# Patient Record
Sex: FEMALE | Race: WHITE | HISPANIC OR LATINO
[De-identification: names, ages, dates, MRNs, and addresses within clinical notes are randomized per-mention and may not be internally consistent; named-entity substitution may affect disease eponyms.]

---

## 2020-01-13 PROBLEM — Z00.00 ENCOUNTER FOR PREVENTIVE HEALTH EXAMINATION: Status: ACTIVE | Noted: 2020-01-13

## 2020-01-14 ENCOUNTER — APPOINTMENT (OUTPATIENT)
Dept: OBGYN | Facility: CLINIC | Age: 30
End: 2020-01-14
Payer: MEDICAID

## 2020-01-14 VITALS
BODY MASS INDEX: 19.88 KG/M2 | SYSTOLIC BLOOD PRESSURE: 120 MMHG | WEIGHT: 108 LBS | DIASTOLIC BLOOD PRESSURE: 68 MMHG | HEIGHT: 62 IN

## 2020-01-14 DIAGNOSIS — N89.8 OTHER SPECIFIED NONINFLAMMATORY DISORDERS OF VAGINA: ICD-10-CM

## 2020-01-14 LAB
BILIRUB UR QL STRIP: ABNORMAL
GLUCOSE UR-MCNC: NORMAL
HCG UR QL: 1 EU/DL
HCG UR QL: NEGATIVE
HGB UR QL STRIP.AUTO: NORMAL
KETONES UR-MCNC: NORMAL
LEUKOCYTE ESTERASE UR QL STRIP: NORMAL
NITRITE UR QL STRIP: NORMAL
PH UR STRIP: 5.5
PROT UR STRIP-MCNC: ABNORMAL
QUALITY CONTROL: YES
SP GR UR STRIP: 1.03

## 2020-01-14 PROCEDURE — 81003 URINALYSIS AUTO W/O SCOPE: CPT | Mod: QW

## 2020-01-14 PROCEDURE — 99213 OFFICE O/P EST LOW 20 MIN: CPT

## 2020-01-14 PROCEDURE — 81025 URINE PREGNANCY TEST: CPT

## 2020-01-14 NOTE — REVIEW OF SYSTEMS
[Nl] : Musculoskeletal [Painful Periods] : painful periods [FreeTextEntry2] : Vaginal dryness, odor and discharge

## 2020-01-14 NOTE — PHYSICAL EXAM
[Oriented x3] : oriented to person, place, and time [Awake] : awake [Alert] : alert [Normal] : cervix [Scant] : scant [Discharge] : a  ~M vaginal discharge was present [Cheesy] : cheesy [White] : white [Uterine Adnexae] : were not tender and not enlarged [Tenderness] : nontender [Motion Tenderness] : there was no cervical motion tenderness [Mass ___ cm] : no uterine mass was palpated [Enlarged ___ wks] : not enlarged

## 2020-01-14 NOTE — HISTORY OF PRESENT ILLNESS
[Last Pap ___] : Last cervical pap smear was [unfilled] [Definite:  ___ (Date)] : the last menstrual period was [unfilled] [Menarche Age: ____] : age at menarche was [unfilled] [Regular Cycle Intervals] : periods have been regular [Sexually Active] : is sexually active [Contraception] : uses contraception [Oral Contraceptives] : uses oral contraceptives [Male ___] : [unfilled] male [Yes] : yes [de-identified] : Pelvic U/S: 8/6/2009

## 2020-01-16 LAB
C TRACH RRNA SPEC QL NAA+PROBE: NOT DETECTED
N GONORRHOEA RRNA SPEC QL NAA+PROBE: NOT DETECTED
SOURCE AMPLIFICATION: NORMAL

## 2020-01-17 LAB
CANDIDA VAG CYTO: NOT DETECTED
G VAGINALIS+PREV SP MTYP VAG QL MICRO: DETECTED
T VAGINALIS VAG QL WET PREP: NOT DETECTED

## 2020-02-07 ENCOUNTER — APPOINTMENT (OUTPATIENT)
Dept: OBGYN | Facility: CLINIC | Age: 30
End: 2020-02-07

## 2020-12-29 ENCOUNTER — APPOINTMENT (OUTPATIENT)
Dept: NEUROLOGY | Facility: CLINIC | Age: 30
End: 2020-12-29
Payer: COMMERCIAL

## 2020-12-29 VITALS
HEIGHT: 62 IN | BODY MASS INDEX: 19.88 KG/M2 | DIASTOLIC BLOOD PRESSURE: 80 MMHG | WEIGHT: 108 LBS | SYSTOLIC BLOOD PRESSURE: 128 MMHG | HEART RATE: 78 BPM | TEMPERATURE: 97.5 F

## 2020-12-29 DIAGNOSIS — Z86.39 PERSONAL HISTORY OF OTHER ENDOCRINE, NUTRITIONAL AND METABOLIC DISEASE: ICD-10-CM

## 2020-12-29 DIAGNOSIS — Z78.9 OTHER SPECIFIED HEALTH STATUS: ICD-10-CM

## 2020-12-29 PROCEDURE — 99072 ADDL SUPL MATRL&STAF TM PHE: CPT

## 2020-12-29 PROCEDURE — 99203 OFFICE O/P NEW LOW 30 MIN: CPT

## 2020-12-29 NOTE — DISCUSSION/SUMMARY
[FreeTextEntry1] : 30-year-old woman history of attention deficit disorder, stable, reviewed and discussed treatment  options.\par We'll do a trial with Vyvanse 30 mg po QD.\par Discussed possible side effects.

## 2020-12-29 NOTE — HISTORY OF PRESENT ILLNESS
[FreeTextEntry1] : 30 year old woman DX with ADHD, on Adderall XR 30 mg in AM and Adderall 15 mg in afternoon. needs a new prescriber. Tolerant of both doses, no reported headaches, CP, palpitations, no mood changes, trouble sleeping. medications work well, help her stay focused, stay on task, finish her work on time. Admits to not using the 50 mg dose as much as she did past, after schedule was changed. Would like a longer acting daily medication.\par

## 2020-12-29 NOTE — PHYSICAL EXAM
[General Appearance - Alert] : alert [General Appearance - In No Acute Distress] : in no acute distress [Oriented To Time, Place, And Person] : oriented to person, place, and time [Impaired Insight] : insight and judgment were intact [Affect] : the affect was normal [Person] : oriented to person [Place] : oriented to place [Time] : oriented to time [Visual Intact] : visual attention was ~T not ~L decreased [Writing A Sentence] : no difficulty writing a sentence [Fluency] : fluency intact [Comprehension] : comprehension intact [Reading] : reading intact [Current Events] : adequate knowledge of current events [Past History] : adequate knowledge of personal past history [Vocabulary] : adequate range of vocabulary [Cranial Nerves Optic (II)] : visual acuity intact bilaterally,  visual fields full to confrontation, pupils equal round and reactive to light [Cranial Nerves Oculomotor (III)] : extraocular motion intact [Cranial Nerves Trigeminal (V)] : facial sensation intact symmetrically [Cranial Nerves Facial (VII)] : face symmetrical [Cranial Nerves Vestibulocochlear (VIII)] : hearing was intact bilaterally [Cranial Nerves Glossopharyngeal (IX)] : tongue and palate midline [Cranial Nerves Accessory (XI - Cranial And Spinal)] : head turning and shoulder shrug symmetric [Cranial Nerves Hypoglossal (XII)] : there was no tongue deviation with protrusion [Motor Tone] : muscle tone was normal in all four extremities [Motor Strength] : muscle strength was normal in all four extremities [Motor Handedness Right-Handed] : the patient is right hand dominant [Paresis Pronator Drift Right-Sided] : no pronator drift on the right [Paresis Pronator Drift Left-Sided] : no pronator drift on the left [Abnormal Walk] : normal gait [Past-pointing] : there was no past-pointing [Tremor] : no tremor present [Dysdiadochokinesia Bilaterally] : not present [Sclera] : the sclera and conjunctiva were normal [PERRL With Normal Accommodation] : pupils were equal in size, round, reactive to light, with normal accommodation [Extraocular Movements] : extraocular movements were intact [Outer Ear] : the ears and nose were normal in appearance [Hearing Threshold Finger Rub Not [place]] : hearing was normal [Neck Appearance] : the appearance of the neck was normal [] : the neck was supple

## 2021-01-04 ENCOUNTER — TRANSCRIPTION ENCOUNTER (OUTPATIENT)
Age: 31
End: 2021-01-04

## 2021-01-06 ENCOUNTER — APPOINTMENT (OUTPATIENT)
Dept: NEUROLOGY | Facility: CLINIC | Age: 31
End: 2021-01-06
Payer: COMMERCIAL

## 2021-01-06 PROCEDURE — 99213 OFFICE O/P EST LOW 20 MIN: CPT | Mod: 95

## 2021-01-06 NOTE — REASON FOR VISIT
[Home] : at home, [unfilled] , at the time of the visit. [Medical Office: ([facility])___] : at the medical office located in  [Verbal consent obtained from patient] : the patient, [unfilled]

## 2021-01-06 NOTE — DISCUSSION/SUMMARY
[FreeTextEntry1] : 30-year-old woman with a history of attention deficit disorder, doing well and tolerated Vyvanse 30 mg aily, would like to try a higher dose. Discussed possible side effects.\par Plan: vyvanse 40 mg po QD\par Follow up in one month.

## 2021-01-28 ENCOUNTER — APPOINTMENT (OUTPATIENT)
Dept: NEUROLOGY | Facility: CLINIC | Age: 31
End: 2021-01-28
Payer: COMMERCIAL

## 2021-01-28 PROCEDURE — 99212 OFFICE O/P EST SF 10 MIN: CPT | Mod: 95

## 2021-01-28 NOTE — HISTORY OF PRESENT ILLNESS
[FreeTextEntry1] : 30-year-old woman with attention deficit disorder, a followup visit. Prescribed Vyvanse ow 40 mg daily,switch from lateral. 5 the medication much more agreeable.Last longer, keeps herin focus, able to do her work on time. Reports no difficulty with the medication, trouble sleeping, good appetite, mood has not changed. She would like to try the 50 mg dose, perhaps hopefully lasting a little longer.\par No complaints of headache, chest or palpitation, no trouble sleeping, mood changes.

## 2021-01-28 NOTE — DISCUSSION/SUMMARY
[FreeTextEntry1] : 30-year-old woman history of attention deficit disorder, doing well on Vyvanse, would like to try higher dose. Discussed possible side effects.\par plan: Increase Vyvanse 50 mg p.o. q. day\par

## 2021-06-02 RX ORDER — DEXTROAMPHETAMINE SACCHARATE, AMPHETAMINE ASPARTATE, DEXTROAMPHETAMINE SULFATE AND AMPHETAMINE SULFATE 3.75; 3.75; 3.75; 3.75 MG/1; MG/1; MG/1; MG/1
15 TABLET ORAL
Qty: 30 | Refills: 0 | Status: DISCONTINUED | COMMUNITY
Start: 2020-11-30 | End: 2021-06-02

## 2021-06-02 RX ORDER — LISDEXAMFETAMINE DIMESYLATE 50 MG/1
50 CAPSULE ORAL
Qty: 7 | Refills: 0 | Status: DISCONTINUED | COMMUNITY
Start: 2021-03-04 | End: 2021-06-02

## 2021-06-02 RX ORDER — CLOTRIMAZOLE AND BETAMETHASONE DIPROPIONATE 10; .5 MG/G; MG/G
1-0.05 CREAM TOPICAL TWICE DAILY
Qty: 1 | Refills: 0 | Status: DISCONTINUED | COMMUNITY
Start: 2020-01-14 | End: 2021-06-02

## 2021-08-11 ENCOUNTER — NON-APPOINTMENT (OUTPATIENT)
Age: 31
End: 2021-08-11

## 2021-08-23 ENCOUNTER — APPOINTMENT (OUTPATIENT)
Dept: NEUROLOGY | Facility: CLINIC | Age: 31
End: 2021-08-23

## 2021-08-23 NOTE — HISTORY OF PRESENT ILLNESS
[FreeTextEntry1] : 30 year old woman hx of ADD, last visit prescribed Vyvanse 50 mg po Qd, so far well tolerated, no reported problems with the medication. Keeps her in focus, allow her to finish work on time.\par No recent illnesses, no new medications.\par Denies any difficulty with sleep, mood changes, no CP, palpitations.\par

## 2021-08-23 NOTE — REASON FOR VISIT
[Home] : at home, [unfilled] , at the time of the visit. [Medical Office: ([facility])___] : at the medical office located in  [Verbal consent obtained from patient] : the patient, [unfilled]

## 2021-09-14 ENCOUNTER — APPOINTMENT (OUTPATIENT)
Dept: NEUROLOGY | Facility: CLINIC | Age: 31
End: 2021-09-14

## 2021-10-05 RX ORDER — LISDEXAMFETAMINE DIMESYLATE 40 MG/1
40 CAPSULE ORAL
Qty: 30 | Refills: 0 | Status: DISCONTINUED | COMMUNITY
Start: 2021-08-12 | End: 2021-10-05

## 2021-11-19 ENCOUNTER — APPOINTMENT (OUTPATIENT)
Dept: NEUROLOGY | Facility: CLINIC | Age: 31
End: 2021-11-19

## 2021-12-23 ENCOUNTER — APPOINTMENT (OUTPATIENT)
Dept: NEUROLOGY | Facility: CLINIC | Age: 31
End: 2021-12-23
Payer: MEDICAID

## 2021-12-23 PROCEDURE — 99212 OFFICE O/P EST SF 10 MIN: CPT | Mod: 95

## 2021-12-23 NOTE — DISCUSSION/SUMMARY
[FreeTextEntry1] : 31-year-old woman history of attention deficit disorder, doing well on present treatment, tolerating Vyvanse 50 mg daily. Review treatment, no change in this time period\par return to office, 4 months

## 2021-12-23 NOTE — HISTORY OF PRESENT ILLNESS
[FreeTextEntry1] : 31-year-old woman with patient deficit disorder, for followup visit. Reports feeling well, no new illnesses or complaints.Presently prescribed Vyvanse 0 mg daily, is well tolerated, no reported problems with sleep,no chest or palpitation, no blood issues.\par She reports her medication allows her to attention, finish her work on time. 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

## 2022-02-18 ENCOUNTER — APPOINTMENT (OUTPATIENT)
Dept: NEUROLOGY | Facility: CLINIC | Age: 32
End: 2022-02-18

## 2022-04-27 ENCOUNTER — APPOINTMENT (OUTPATIENT)
Dept: NEUROLOGY | Facility: CLINIC | Age: 32
End: 2022-04-27

## 2022-05-20 ENCOUNTER — APPOINTMENT (OUTPATIENT)
Dept: NEUROLOGY | Facility: CLINIC | Age: 32
End: 2022-05-20

## 2022-06-10 ENCOUNTER — APPOINTMENT (OUTPATIENT)
Dept: NEUROLOGY | Facility: CLINIC | Age: 32
End: 2022-06-10
Payer: MEDICAID

## 2022-06-10 VITALS
WEIGHT: 119 LBS | HEIGHT: 62 IN | SYSTOLIC BLOOD PRESSURE: 135 MMHG | HEART RATE: 102 BPM | TEMPERATURE: 97.8 F | BODY MASS INDEX: 21.9 KG/M2 | DIASTOLIC BLOOD PRESSURE: 97 MMHG

## 2022-06-10 PROCEDURE — 99214 OFFICE O/P EST MOD 30 MIN: CPT

## 2022-06-10 RX ORDER — FLUCONAZOLE 150 MG/1
150 TABLET ORAL
Qty: 2 | Refills: 1 | Status: DISCONTINUED | COMMUNITY
Start: 2020-01-14 | End: 2022-06-10

## 2022-06-10 NOTE — DISCUSSION/SUMMARY
[FreeTextEntry1] : 31-year-old woman, with attention deficit disorder, doing well, stable on present dose of Vyvanse 50 mg once a day.  As noted his effects wane at the afternoon, discussed options.\par Plan: Continue with Vyvanse 50 mg p.o. every morning.\par Adderall 10 mg p.o. in mid afternoon.\par Advised to pay attention and its possible effect on ability to go to sleep. \par Return to office, 6 months

## 2022-06-10 NOTE — HISTORY OF PRESENT ILLNESS
[FreeTextEntry1] : 31-year-old woman, with a history of attention deficit disorder, here for follow-up visit.  Presently prescribed Vyvanse 50 mg once in the morning, with good results, helps her focus maintain attention.  She reports she takes her medication around 5 AM, her day was local for no significant, medications affecting complaint by the early to mid afternoon.\par No reported side effects, no trouble sleeping, no chest palpitation, no mood changes.

## 2022-09-23 NOTE — OB HISTORY
Hema Ponce   70 year old    Patient Care Team:  Beltran Michele MD as PCP - General (Family Practice)  Princess Fabian DO as Obstetrics & Gynecology (Obstetrics & Gynecology)  Sarita Shaw MD as General Surgeon (General Surgery)   Sarita Shaw MD    No chief complaint on file.  Ms. Ponce is a 70 year old female with a history of bilateral fibrocystic breast. Here for imaging and exam.      10/3/22-Brian. Screening Mammogram-    Patient denies any palpable mass, discharge, pain, or skin changes.    General Breast History:  Age at menarche: 15  Last menstrual period: 54  : 2 Para: 2  Age at first completed pregnancy: 23  Breast-feeding history: Yes, briefly   History of oral contraceptives: Year 1 year   History of hormone replacement therapy or fertility assistance: No   Previous Breast Biopsies or Surgeries: Right breast bx unsure of year  Any  first degree relative breast or ovarian cancer Sister breast cancer late 50's  Ethnicity:      BREAST IMAGING:      [___] : Total Pregnancies: [unfilled]

## 2023-01-06 ENCOUNTER — APPOINTMENT (OUTPATIENT)
Dept: NEUROLOGY | Facility: CLINIC | Age: 33
End: 2023-01-06

## 2023-01-24 ENCOUNTER — APPOINTMENT (OUTPATIENT)
Dept: NEUROLOGY | Facility: CLINIC | Age: 33
End: 2023-01-24
Payer: MEDICAID

## 2023-01-24 ENCOUNTER — NON-APPOINTMENT (OUTPATIENT)
Age: 33
End: 2023-01-24

## 2023-01-24 VITALS
HEIGHT: 62 IN | WEIGHT: 115 LBS | DIASTOLIC BLOOD PRESSURE: 90 MMHG | SYSTOLIC BLOOD PRESSURE: 132 MMHG | HEART RATE: 99 BPM | TEMPERATURE: 97.8 F | BODY MASS INDEX: 21.16 KG/M2

## 2023-01-24 DIAGNOSIS — F98.8 OTHER SPECIFIED BEHAVIORAL AND EMOTIONAL DISORDERS WITH ONSET USUALLY OCCURRING IN CHILDHOOD AND ADOLESCENCE: ICD-10-CM

## 2023-01-24 PROCEDURE — 99213 OFFICE O/P EST LOW 20 MIN: CPT

## 2023-01-24 NOTE — REVIEW OF SYSTEMS
[Fever] : no fever [Chills] : no chills [Feeling Tired] : not feeling tired [Confused or Disoriented] : no confusion [Memory Lapses or Loss] : no memory loss [Decr. Concentrating Ability] : no decrease in concentrating ability [Difficulty with Language] : no ~M difficulty with language [Facial Weakness] : no facial weakness [Arm Weakness] : no arm weakness [Hand Weakness] : no hand weakness [Leg Weakness] : no leg weakness [Poor Coordination] : good coordination [Numbness] : no numbness [Tingling] : no tingling [Seizures] : no convulsions [Dizziness] : no dizziness [Fainting] : no fainting [Lightheadedness] : no lightheadedness [Vertigo] : no vertigo [Cluster Headache] : no cluster headache [Migraine Headache] : no migraine headache [Tension Headache] : no tension-type headache [Difficulty Walking] : no difficulty walking [Sleep Disturbances] : no sleep disturbances [Anxiety] : no anxiety [Depression] : no depression [Eye Pain] : no eye pain [Loss Of Hearing] : no hearing loss [Heart Rate Is Fast] : the heart rate was not fast [Chest Pain] : no chest pain [Palpitations] : no palpitations [Shortness Of Breath] : no shortness of breath [SOB on Exertion] : no shortness of breath during exertion [Abdominal Pain] : no abdominal pain [Swollen Glands] : no swollen glands [Swollen Glands In The Neck] : no swollen glands in the neck

## 2023-01-24 NOTE — DISCUSSION/SUMMARY
[FreeTextEntry1] : 32-year-old female with history of adult onset ADD.  Currently on Vyvanse 50 mg every morning and Adderall short acting 10 mg every 24 hours in p.m. patient stable on current regimen\par \par #Adult onset ADD\par \par 1.  Vyvanse 50 mg q. 24 in a.m.\par 2.  Amphetamine–dextroamphetamine 10 mg every 24 hours in p.m.\par 3.  Patient knows to call in 2 to 3 days prior for refills\par 4.  Return to clinic in 6 months for follow-up evaluation.

## 2023-01-24 NOTE — HISTORY OF PRESENT ILLNESS
[FreeTextEntry1] : 32-year-old female returns to clinic for follow-up evaluation for adult ADD last seen by Dr. Green in Kiki 10, 2022.  Patient diagnosed at 18 years of age.  Patient currently on Vyvanse 50 mg every morning and Adderall SA 10 mg every 24 hours for wear off.  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 pending her schedule that could be as early as 5 AM to as late as 9 AM.  Wear off usually occur 6 hours later depending on activity.  Patient has been on above regimen and stable for some time.  She denies any mood or behavioral changes insomnia, shortness of breath chest pain tachycardia palpitations.  Her adult ADHD self-report scale is as follows:\par How often do you have trouble wrapping up the final details of a project once the challenging parts have been done?  Rarely\par How often do you have difficulty getting things in order when you have to do a task that requires organization?  Rarely\par How often do you have problems remembering appointments or obligations?  Rarely\par When you have a task that requires a lot of thought how often do you avoid or delay getting started?  Rarely\par How often do you fidget or squirm with your hands or feet when you have to sit down for a long time?  Sometimes\par How often do you feel overly active or compelled to do things like you were driven by a motor?  Never\par How often do you make careless mistakes when you have to work on a boring or difficult project?  Sometimes\par How often do you have difficulty keeping your attention when you are doing boring or repetitive work?  Rarely\par How often do you have difficulty concentrating on what people say to you even when they are speaking to you directly?  Rarely\par How often do you misplace or have difficulty finding things at home or at work?  Rarely\par How often are you distracted by activity or noise around you?  Sometimes\par How often do you leave your seat in meetings or other situations in which you are expected to remain seated?  Never\par How often do you feel restless or fidgety?  Rarely\par How often do you have difficulty unwinding and relaxing when you have time to yourself?  Rarely\par How often do you find yourself talking too much when you are in social situations?  Never\par When you are in a conversation, how often do you find yourself finishing the sentences of the people you are talking to, before they can finish them themselves?  Never\par How often do you have difficulty waiting your turn in situations when turn-taking is required?  Never\par How often do you interrupt others when they are busy?  Never

## 2023-01-24 NOTE — PHYSICAL EXAM
[General Appearance - Alert] : alert [General Appearance - In No Acute Distress] : in no acute distress [Oriented To Time, Place, And Person] : oriented to person, place, and time [Cranial Nerves Optic (II)] : visual acuity intact bilaterally,  visual fields full to confrontation, pupils equal round and reactive to light [Cranial Nerves Oculomotor (III)] : extraocular motion intact [Cranial Nerves Trigeminal (V)] : facial sensation intact symmetrically [Cranial Nerves Facial (VII)] : face symmetrical [Cranial Nerves Vestibulocochlear (VIII)] : hearing was intact bilaterally [Cranial Nerves Glossopharyngeal (IX)] : tongue and palate midline [Cranial Nerves Accessory (XI - Cranial And Spinal)] : head turning and shoulder shrug symmetric [Cranial Nerves Hypoglossal (XII)] : there was no tongue deviation with protrusion [Motor Strength] : muscle strength was normal in all four extremities [Involuntary Movements] : no involuntary movements were seen [No Muscle Atrophy] : normal bulk in all four extremities [Motor Handedness Right-Handed] : the patient is right hand dominant [Sensation Tactile Decrease] : light touch was intact [Sensation Vibration Decrease] : vibration was intact [Proprioception] : proprioception was intact [Abnormal Walk] : normal gait [Balance] : balance was intact [2+] : Ankle jerk left 2+ [PERRL With Normal Accommodation] : pupils were equal in size, round, reactive to light, with normal accommodation [Extraocular Movements] : extraocular movements were intact [Full Visual Field] : full visual field [Hearing Threshold Finger Rub Not [place]] : hearing was normal [Neck Cervical Mass (___cm)] : no neck mass was observed [Jugular Venous Distention Increased] : there was no jugular-venous distention [Abdomen Soft] : soft [] : no rash [Skin Lesions] : no skin lesions [FreeTextEntry1] : Awake [Paresis Pronator Drift Right-Sided] : no pronator drift on the right [Paresis Pronator Drift Left-Sided] : no pronator drift on the left [Romberg's Sign] : Romberg's sign was negtive [Tremor] : no tremor present

## 2023-03-26 ENCOUNTER — NON-APPOINTMENT (OUTPATIENT)
Age: 33
End: 2023-03-26

## 2023-03-29 ENCOUNTER — NON-APPOINTMENT (OUTPATIENT)
Age: 33
End: 2023-03-29

## 2023-07-06 ENCOUNTER — APPOINTMENT (OUTPATIENT)
Dept: NEUROLOGY | Facility: CLINIC | Age: 33
End: 2023-07-06

## 2023-08-30 RX ORDER — DEXTROAMPHETAMINE SACCHARATE, AMPHETAMINE ASPARTATE MONOHYDRATE, DEXTROAMPHETAMINE SULFATE AND AMPHETAMINE SULFATE 2.5; 2.5; 2.5; 2.5 MG/1; MG/1; MG/1; MG/1
10 CAPSULE, EXTENDED RELEASE ORAL
Qty: 30 | Refills: 0 | Status: DISCONTINUED | COMMUNITY
Start: 2023-02-08 | End: 2023-08-30

## 2023-11-13 ENCOUNTER — TRANSCRIPTION ENCOUNTER (OUTPATIENT)
Age: 33
End: 2023-11-13

## 2023-11-14 ENCOUNTER — INPATIENT (INPATIENT)
Facility: HOSPITAL | Age: 33
LOS: 2 days | Discharge: HOME | DRG: 833 | End: 2023-11-17
Attending: STUDENT IN AN ORGANIZED HEALTH CARE EDUCATION/TRAINING PROGRAM | Admitting: STUDENT IN AN ORGANIZED HEALTH CARE EDUCATION/TRAINING PROGRAM
Payer: COMMERCIAL

## 2023-11-14 ENCOUNTER — APPOINTMENT (OUTPATIENT)
Dept: ANTEPARTUM | Facility: CLINIC | Age: 33
End: 2023-11-14
Payer: MEDICAID

## 2023-11-14 ENCOUNTER — ASOB RESULT (OUTPATIENT)
Age: 33
End: 2023-11-14

## 2023-11-14 ENCOUNTER — APPOINTMENT (OUTPATIENT)
Dept: OBGYN | Facility: CLINIC | Age: 33
End: 2023-11-14
Payer: MEDICAID

## 2023-11-14 ENCOUNTER — TRANSCRIPTION ENCOUNTER (OUTPATIENT)
Age: 33
End: 2023-11-14

## 2023-11-14 VITALS
DIASTOLIC BLOOD PRESSURE: 93 MMHG | RESPIRATION RATE: 20 BRPM | TEMPERATURE: 98 F | WEIGHT: 114.86 LBS | HEIGHT: 63 IN | HEART RATE: 99 BPM | OXYGEN SATURATION: 99 % | SYSTOLIC BLOOD PRESSURE: 137 MMHG

## 2023-11-14 VITALS
DIASTOLIC BLOOD PRESSURE: 72 MMHG | HEIGHT: 62 IN | WEIGHT: 115 LBS | BODY MASS INDEX: 21.16 KG/M2 | SYSTOLIC BLOOD PRESSURE: 110 MMHG

## 2023-11-14 DIAGNOSIS — O00.90 UNSPECIFIED ECTOPIC PREGNANCY WITHOUT INTRAUTERINE PREGNANCY: ICD-10-CM

## 2023-11-14 DIAGNOSIS — Z34.90 ENCOUNTER FOR SUPERVISION OF NORMAL PREGNANCY, UNSPECIFIED, UNSPECIFIED TRIMESTER: ICD-10-CM

## 2023-11-14 LAB
ALBUMIN SERPL ELPH-MCNC: 4.4 G/DL — SIGNIFICANT CHANGE UP (ref 3.3–5.2)
ALBUMIN SERPL ELPH-MCNC: 4.4 G/DL — SIGNIFICANT CHANGE UP (ref 3.3–5.2)
ALP SERPL-CCNC: 51 U/L — SIGNIFICANT CHANGE UP (ref 40–120)
ALP SERPL-CCNC: 51 U/L — SIGNIFICANT CHANGE UP (ref 40–120)
ALT FLD-CCNC: 12 U/L — SIGNIFICANT CHANGE UP
ALT FLD-CCNC: 12 U/L — SIGNIFICANT CHANGE UP
ANION GAP SERPL CALC-SCNC: 13 MMOL/L — SIGNIFICANT CHANGE UP (ref 5–17)
ANION GAP SERPL CALC-SCNC: 13 MMOL/L — SIGNIFICANT CHANGE UP (ref 5–17)
APPEARANCE UR: CLEAR — SIGNIFICANT CHANGE UP
APPEARANCE UR: CLEAR — SIGNIFICANT CHANGE UP
APTT BLD: 33.3 SEC — SIGNIFICANT CHANGE UP (ref 24.5–35.6)
APTT BLD: 33.3 SEC — SIGNIFICANT CHANGE UP (ref 24.5–35.6)
AST SERPL-CCNC: 14 U/L — SIGNIFICANT CHANGE UP
AST SERPL-CCNC: 14 U/L — SIGNIFICANT CHANGE UP
BASOPHILS # BLD AUTO: 0.05 K/UL — SIGNIFICANT CHANGE UP (ref 0–0.2)
BASOPHILS # BLD AUTO: 0.05 K/UL — SIGNIFICANT CHANGE UP (ref 0–0.2)
BASOPHILS NFR BLD AUTO: 0.5 % — SIGNIFICANT CHANGE UP (ref 0–2)
BASOPHILS NFR BLD AUTO: 0.5 % — SIGNIFICANT CHANGE UP (ref 0–2)
BILIRUB SERPL-MCNC: 0.3 MG/DL — LOW (ref 0.4–2)
BILIRUB SERPL-MCNC: 0.3 MG/DL — LOW (ref 0.4–2)
BILIRUB UR-MCNC: NEGATIVE — SIGNIFICANT CHANGE UP
BILIRUB UR-MCNC: NEGATIVE — SIGNIFICANT CHANGE UP
BLD GP AB SCN SERPL QL: SIGNIFICANT CHANGE UP
BLD GP AB SCN SERPL QL: SIGNIFICANT CHANGE UP
BUN SERPL-MCNC: 8.7 MG/DL — SIGNIFICANT CHANGE UP (ref 8–20)
BUN SERPL-MCNC: 8.7 MG/DL — SIGNIFICANT CHANGE UP (ref 8–20)
CALCIUM SERPL-MCNC: 9.3 MG/DL — SIGNIFICANT CHANGE UP (ref 8.4–10.5)
CALCIUM SERPL-MCNC: 9.3 MG/DL — SIGNIFICANT CHANGE UP (ref 8.4–10.5)
CHLORIDE SERPL-SCNC: 102 MMOL/L — SIGNIFICANT CHANGE UP (ref 96–108)
CHLORIDE SERPL-SCNC: 102 MMOL/L — SIGNIFICANT CHANGE UP (ref 96–108)
CO2 SERPL-SCNC: 23 MMOL/L — SIGNIFICANT CHANGE UP (ref 22–29)
CO2 SERPL-SCNC: 23 MMOL/L — SIGNIFICANT CHANGE UP (ref 22–29)
COLOR SPEC: YELLOW — SIGNIFICANT CHANGE UP
COLOR SPEC: YELLOW — SIGNIFICANT CHANGE UP
CREAT SERPL-MCNC: 0.57 MG/DL — SIGNIFICANT CHANGE UP (ref 0.5–1.3)
CREAT SERPL-MCNC: 0.57 MG/DL — SIGNIFICANT CHANGE UP (ref 0.5–1.3)
DIFF PNL FLD: NEGATIVE — SIGNIFICANT CHANGE UP
DIFF PNL FLD: NEGATIVE — SIGNIFICANT CHANGE UP
EGFR: 125 ML/MIN/1.73M2 — SIGNIFICANT CHANGE UP
EGFR: 125 ML/MIN/1.73M2 — SIGNIFICANT CHANGE UP
EOSINOPHIL # BLD AUTO: 0.02 K/UL — SIGNIFICANT CHANGE UP (ref 0–0.5)
EOSINOPHIL # BLD AUTO: 0.02 K/UL — SIGNIFICANT CHANGE UP (ref 0–0.5)
EOSINOPHIL NFR BLD AUTO: 0.2 % — SIGNIFICANT CHANGE UP (ref 0–6)
EOSINOPHIL NFR BLD AUTO: 0.2 % — SIGNIFICANT CHANGE UP (ref 0–6)
GLUCOSE SERPL-MCNC: 83 MG/DL — SIGNIFICANT CHANGE UP (ref 70–99)
GLUCOSE SERPL-MCNC: 83 MG/DL — SIGNIFICANT CHANGE UP (ref 70–99)
GLUCOSE UR QL: NEGATIVE MG/DL — SIGNIFICANT CHANGE UP
GLUCOSE UR QL: NEGATIVE MG/DL — SIGNIFICANT CHANGE UP
HCG SERPL-ACNC: 5165 MIU/ML — HIGH
HCG SERPL-ACNC: 5165 MIU/ML — HIGH
HCT VFR BLD CALC: 38.3 % — SIGNIFICANT CHANGE UP (ref 34.5–45)
HCT VFR BLD CALC: 38.3 % — SIGNIFICANT CHANGE UP (ref 34.5–45)
HGB BLD-MCNC: 13.4 G/DL — SIGNIFICANT CHANGE UP (ref 11.5–15.5)
HGB BLD-MCNC: 13.4 G/DL — SIGNIFICANT CHANGE UP (ref 11.5–15.5)
HIV 1 & 2 AB SERPL IA.RAPID: SIGNIFICANT CHANGE UP
HIV 1 & 2 AB SERPL IA.RAPID: SIGNIFICANT CHANGE UP
IMM GRANULOCYTES NFR BLD AUTO: 0.3 % — SIGNIFICANT CHANGE UP (ref 0–0.9)
IMM GRANULOCYTES NFR BLD AUTO: 0.3 % — SIGNIFICANT CHANGE UP (ref 0–0.9)
INR BLD: 1.19 RATIO — HIGH (ref 0.85–1.18)
INR BLD: 1.19 RATIO — HIGH (ref 0.85–1.18)
KETONES UR-MCNC: ABNORMAL MG/DL
KETONES UR-MCNC: ABNORMAL MG/DL
LEUKOCYTE ESTERASE UR-ACNC: NEGATIVE — SIGNIFICANT CHANGE UP
LEUKOCYTE ESTERASE UR-ACNC: NEGATIVE — SIGNIFICANT CHANGE UP
LYMPHOCYTES # BLD AUTO: 2.84 K/UL — SIGNIFICANT CHANGE UP (ref 1–3.3)
LYMPHOCYTES # BLD AUTO: 2.84 K/UL — SIGNIFICANT CHANGE UP (ref 1–3.3)
LYMPHOCYTES # BLD AUTO: 30.1 % — SIGNIFICANT CHANGE UP (ref 13–44)
LYMPHOCYTES # BLD AUTO: 30.1 % — SIGNIFICANT CHANGE UP (ref 13–44)
MCHC RBC-ENTMCNC: 31.5 PG — SIGNIFICANT CHANGE UP (ref 27–34)
MCHC RBC-ENTMCNC: 31.5 PG — SIGNIFICANT CHANGE UP (ref 27–34)
MCHC RBC-ENTMCNC: 35 GM/DL — SIGNIFICANT CHANGE UP (ref 32–36)
MCHC RBC-ENTMCNC: 35 GM/DL — SIGNIFICANT CHANGE UP (ref 32–36)
MCV RBC AUTO: 89.9 FL — SIGNIFICANT CHANGE UP (ref 80–100)
MCV RBC AUTO: 89.9 FL — SIGNIFICANT CHANGE UP (ref 80–100)
MONOCYTES # BLD AUTO: 0.48 K/UL — SIGNIFICANT CHANGE UP (ref 0–0.9)
MONOCYTES # BLD AUTO: 0.48 K/UL — SIGNIFICANT CHANGE UP (ref 0–0.9)
MONOCYTES NFR BLD AUTO: 5.1 % — SIGNIFICANT CHANGE UP (ref 2–14)
MONOCYTES NFR BLD AUTO: 5.1 % — SIGNIFICANT CHANGE UP (ref 2–14)
NEUTROPHILS # BLD AUTO: 6.03 K/UL — SIGNIFICANT CHANGE UP (ref 1.8–7.4)
NEUTROPHILS # BLD AUTO: 6.03 K/UL — SIGNIFICANT CHANGE UP (ref 1.8–7.4)
NEUTROPHILS NFR BLD AUTO: 63.8 % — SIGNIFICANT CHANGE UP (ref 43–77)
NEUTROPHILS NFR BLD AUTO: 63.8 % — SIGNIFICANT CHANGE UP (ref 43–77)
NITRITE UR-MCNC: NEGATIVE — SIGNIFICANT CHANGE UP
NITRITE UR-MCNC: NEGATIVE — SIGNIFICANT CHANGE UP
PH UR: 6.5 — SIGNIFICANT CHANGE UP (ref 5–8)
PH UR: 6.5 — SIGNIFICANT CHANGE UP (ref 5–8)
PLATELET # BLD AUTO: 359 K/UL — SIGNIFICANT CHANGE UP (ref 150–400)
PLATELET # BLD AUTO: 359 K/UL — SIGNIFICANT CHANGE UP (ref 150–400)
POTASSIUM SERPL-MCNC: 3.7 MMOL/L — SIGNIFICANT CHANGE UP (ref 3.5–5.3)
POTASSIUM SERPL-MCNC: 3.7 MMOL/L — SIGNIFICANT CHANGE UP (ref 3.5–5.3)
POTASSIUM SERPL-SCNC: 3.7 MMOL/L — SIGNIFICANT CHANGE UP (ref 3.5–5.3)
POTASSIUM SERPL-SCNC: 3.7 MMOL/L — SIGNIFICANT CHANGE UP (ref 3.5–5.3)
PROT SERPL-MCNC: 7 G/DL — SIGNIFICANT CHANGE UP (ref 6.6–8.7)
PROT SERPL-MCNC: 7 G/DL — SIGNIFICANT CHANGE UP (ref 6.6–8.7)
PROT UR-MCNC: NEGATIVE MG/DL — SIGNIFICANT CHANGE UP
PROT UR-MCNC: NEGATIVE MG/DL — SIGNIFICANT CHANGE UP
PROTHROM AB SERPL-ACNC: 13.1 SEC — HIGH (ref 9.5–13)
PROTHROM AB SERPL-ACNC: 13.1 SEC — HIGH (ref 9.5–13)
RBC # BLD: 4.26 M/UL — SIGNIFICANT CHANGE UP (ref 3.8–5.2)
RBC # BLD: 4.26 M/UL — SIGNIFICANT CHANGE UP (ref 3.8–5.2)
RBC # FLD: 12.2 % — SIGNIFICANT CHANGE UP (ref 10.3–14.5)
RBC # FLD: 12.2 % — SIGNIFICANT CHANGE UP (ref 10.3–14.5)
SODIUM SERPL-SCNC: 138 MMOL/L — SIGNIFICANT CHANGE UP (ref 135–145)
SODIUM SERPL-SCNC: 138 MMOL/L — SIGNIFICANT CHANGE UP (ref 135–145)
SP GR SPEC: 1.01 — SIGNIFICANT CHANGE UP (ref 1–1.03)
SP GR SPEC: 1.01 — SIGNIFICANT CHANGE UP (ref 1–1.03)
T3 SERPL-MCNC: 110 NG/DL — SIGNIFICANT CHANGE UP (ref 80–200)
T3 SERPL-MCNC: 110 NG/DL — SIGNIFICANT CHANGE UP (ref 80–200)
T4 AB SER-ACNC: 7.6 UG/DL — SIGNIFICANT CHANGE UP (ref 4.5–12)
T4 AB SER-ACNC: 7.6 UG/DL — SIGNIFICANT CHANGE UP (ref 4.5–12)
TSH SERPL-MCNC: 7.72 UIU/ML — HIGH (ref 0.27–4.2)
TSH SERPL-MCNC: 7.72 UIU/ML — HIGH (ref 0.27–4.2)
UROBILINOGEN FLD QL: 0.2 MG/DL — SIGNIFICANT CHANGE UP (ref 0.2–1)
UROBILINOGEN FLD QL: 0.2 MG/DL — SIGNIFICANT CHANGE UP (ref 0.2–1)
WBC # BLD: 9.45 K/UL — SIGNIFICANT CHANGE UP (ref 3.8–10.5)
WBC # BLD: 9.45 K/UL — SIGNIFICANT CHANGE UP (ref 3.8–10.5)
WBC # FLD AUTO: 9.45 K/UL — SIGNIFICANT CHANGE UP (ref 3.8–10.5)
WBC # FLD AUTO: 9.45 K/UL — SIGNIFICANT CHANGE UP (ref 3.8–10.5)

## 2023-11-14 PROCEDURE — 76817 TRANSVAGINAL US OBSTETRIC: CPT

## 2023-11-14 PROCEDURE — 59200 INSERT CERVICAL DILATOR: CPT

## 2023-11-14 PROCEDURE — 99204 OFFICE O/P NEW MOD 45 MIN: CPT | Mod: TH

## 2023-11-14 PROCEDURE — 99285 EMERGENCY DEPT VISIT HI MDM: CPT

## 2023-11-14 DEVICE — BLLN CERV RIPENING WITH STYLET 40CM 10 EA/BX: Type: IMPLANTABLE DEVICE | Status: FUNCTIONAL

## 2023-11-14 RX ORDER — IBUPROFEN 200 MG
600 TABLET ORAL EVERY 6 HOURS
Refills: 0 | Status: DISCONTINUED | OUTPATIENT
Start: 2023-11-14 | End: 2023-11-15

## 2023-11-14 RX ORDER — ACETAMINOPHEN 500 MG
1000 TABLET ORAL ONCE
Refills: 0 | Status: COMPLETED | OUTPATIENT
Start: 2023-11-14 | End: 2023-11-14

## 2023-11-14 RX ORDER — KETOROLAC TROMETHAMINE 30 MG/ML
15 SYRINGE (ML) INJECTION EVERY 8 HOURS
Refills: 0 | Status: DISCONTINUED | OUTPATIENT
Start: 2023-11-14 | End: 2023-11-15

## 2023-11-14 RX ORDER — METHOTREXATE 2.5 MG/1
76 TABLET ORAL ONCE
Refills: 0 | Status: COMPLETED | OUTPATIENT
Start: 2023-11-14 | End: 2023-11-14

## 2023-11-14 RX ORDER — SIMETHICONE 80 MG/1
80 TABLET, CHEWABLE ORAL EVERY 6 HOURS
Refills: 0 | Status: DISCONTINUED | OUTPATIENT
Start: 2023-11-14 | End: 2023-11-17

## 2023-11-14 RX ORDER — ONDANSETRON 8 MG/1
8 TABLET, FILM COATED ORAL EVERY 8 HOURS
Refills: 0 | Status: DISCONTINUED | OUTPATIENT
Start: 2023-11-14 | End: 2023-11-17

## 2023-11-14 RX ORDER — ACETAMINOPHEN 500 MG
1000 TABLET ORAL EVERY 6 HOURS
Refills: 0 | Status: DISCONTINUED | OUTPATIENT
Start: 2023-11-14 | End: 2023-11-15

## 2023-11-14 RX ORDER — FENTANYL CITRATE 50 UG/ML
25 INJECTION INTRAVENOUS
Refills: 0 | Status: DISCONTINUED | OUTPATIENT
Start: 2023-11-14 | End: 2023-11-14

## 2023-11-14 RX ORDER — ACETAMINOPHEN 500 MG
975 TABLET ORAL EVERY 6 HOURS
Refills: 0 | Status: DISCONTINUED | OUTPATIENT
Start: 2023-11-14 | End: 2023-11-15

## 2023-11-14 RX ADMIN — Medication 1000 MILLIGRAM(S): at 22:30

## 2023-11-14 RX ADMIN — FENTANYL CITRATE 25 MICROGRAM(S): 50 INJECTION INTRAVENOUS at 22:57

## 2023-11-14 RX ADMIN — METHOTREXATE 76 MILLIGRAM(S): 2.5 TABLET ORAL at 20:59

## 2023-11-14 RX ADMIN — Medication 15 MILLIGRAM(S): at 22:29

## 2023-11-14 RX ADMIN — Medication 15 MILLIGRAM(S): at 22:56

## 2023-11-14 RX ADMIN — Medication 400 MILLIGRAM(S): at 22:15

## 2023-11-14 RX ADMIN — FENTANYL CITRATE 25 MICROGRAM(S): 50 INJECTION INTRAVENOUS at 22:23

## 2023-11-14 RX ADMIN — FENTANYL CITRATE 25 MICROGRAM(S): 50 INJECTION INTRAVENOUS at 22:32

## 2023-11-14 RX ADMIN — FENTANYL CITRATE 25 MICROGRAM(S): 50 INJECTION INTRAVENOUS at 23:10

## 2023-11-14 RX ADMIN — FENTANYL CITRATE 25 MICROGRAM(S): 50 INJECTION INTRAVENOUS at 22:43

## 2023-11-14 RX ADMIN — FENTANYL CITRATE 25 MICROGRAM(S): 50 INJECTION INTRAVENOUS at 22:33

## 2023-11-14 NOTE — ED PROVIDER NOTE - NS ED ROS FT
Constitutional: (-) fever  (-)chills  (-)sweats  Eyes/ENT: (-)   Cardiovascular: (-) chest pain, (-) palpitations (-) edema   Respiratory: (-) cough, (-) shortness of breath   Gastrointestinal: (-)nausea  (-)vomiting, (-) diarrhea  (+) abdominal pain   :  (-)dysuria, (-)frequency, (-)urgency, (-)hematuria  +vaginal bleeding  Musculoskeletal: (-) neck pain, (-) back pain, (-) joint pain  Integumentary: (-) rash, (-) edema  Neurological: (-) headache, (-) altered mental status  (-)LOC

## 2023-11-14 NOTE — H&P ADULT - HISTORY OF PRESENT ILLNESS
33 yo  at 5w1d by LMP sent to [facility] ED by OBGYN provider due to findings of cervical ectopic pregnancy on TVUS. Patient seen and evaluated in ED,  she mentioned she learned she was pregnant last Friday. She had some nausea over and some abdominal pressure she denies any pain. She reports some vaginal spotting that started this morning. She otherwise denies any pain, vomiting, fevers, chills, diarrhea, weakness, lightheadedness, SOB.      Patient receives care with [facility]   LMP: 10/9/23      POBHx:    PGynHx: Denies fibroids, STIs, last pap years ago wnl. Reports hx cysts when young    PMHx: ADHD, hx thyroid storm at age 24 – dizziness, fatigue. Last saw endocrinologist “Dr. ARGUETA” many years ago, next appt 2023   PSHx: wisdom tooth extraction   Meds: Vyvanse 50mg QD, Adderall 10mg QHS   All: nkda  31 yo  at 5w1d by LMP sent to [facility] ED by OBGYN provider due to findings of cervical ectopic pregnancy on TVUS. Patient seen and evaluated in ED,  she mentioned she learned she was pregnant last Friday. She had some nausea over and some abdominal pressure she denies any pain. She reports some vaginal spotting that started this morning. She otherwise denies any pain, vomiting, fevers, chills, diarrhea, weakness, lightheadedness, SOB. She mentions her last PO was at 12pm.     Patient receives care with [facility]   LMP: 10/9/23      POBHx:    PGynHx: Denies fibroids, STIs, last pap years ago wnl. Reports hx cysts when young    PMHx: ADHD, hx thyroid storm at age 24 – dizziness, fatigue. Last saw endocrinologist “Dr. ARGUETA” many years ago, next appt 2023   PSHx: wisdom tooth extraction   Meds: Vyvanse 50mg QD, Adderall 10mg QHS   All: nkda

## 2023-11-14 NOTE — H&P ADULT - NSHPLABSRESULTS_GEN_ALL_CORE
TVUS    Transvaginal Ultrasound Obstetrical ([id number]) ([facility]   [id number])   ----------------------------------------------------------------------   Indications   Uterine size-date discrepancy, first trimester O26.841   Encounter for Fetal Viability/Dating      O36.80   Weeks of gestation of pregnancy not      Z3A.00   specified   ----------------------------------------------------------------------   Vital Signs   Weight (lb): 117   Height:   5'3"            BMI:       20.72   ----------------------------------------------------------------------   Fetal Evaluation   Num Of Fetuses:     1   Yolk Sac:        1.0   ----------------------------------------------------------------------   Biometry   GS:    6.9 mm   G.Age:  N/A           YUDELKA:   ----------------------------------------------------------------------   OB History   :  1     Term:  0    Stephen:  0    SAB:  0   TOP:     0    Ectopic: 0    Livin   ----------------------------------------------------------------------   Cervix Uterus Adnexa   Right Ovary   Size(cm)    3  x  2.42  x 1.83   Vol(ml): 6.96   Within normal limits   Left Ovary   Size(cm)   2.25  x  2.19  x 1.83   Vol(ml): 4.72   Within normal limits   ?Cul De Sac   No fluid seen   ----------------------------------------------------------------------   Comments   A gestational sac with a yolk sac are noted within the   cervix. No fetal pole seen at this time.

## 2023-11-14 NOTE — BRIEF OPERATIVE NOTE - NSICDXBRIEFPROCEDURE_GEN_ALL_CORE_FT
PROCEDURES:  Insertion, cervical ripening balloon catheter 14-Nov-2023 22:09:48  Del Monet Duran  US transvaginal 14-Nov-2023 22:09:58  Del Monet Duran

## 2023-11-14 NOTE — ED PROVIDER NOTE - OBJECTIVE STATEMENT
32yoF; with PMH signif for Hyperthyroidism,  @5weeks; now p/w vaginal bleeding today. patient report having generalized malaise over the past week. reports some abd cramping 2-3 days with vaginal spotting today. 32yoF; with PMH signif for Hyperthyroidism,  @5weeks; now p/w vaginal bleeding today. patient report having generalized malaise over the past week. reports some abd cramping 2-3 days with vaginal spotting today.  patient reports having some lightheadedness this morning as well. states she went to GYN who did bedside US revealing cervical ectopic pregnancy. patient sent to ED for MTX and surgical management.  denies f/c/s. denies cp/sob/palp.  PMH: Hyperthyroidism  SOCIAL: non-smoker

## 2023-11-14 NOTE — ED PROVIDER NOTE - CLINICAL SUMMARY MEDICAL DECISION MAKING FREE TEXT BOX
patient arrived with abdominal pain and vaginal bleeding. found to have ectopic cervical pregnancy.  hemodynamically stable.  will admit for surgical management.

## 2023-11-14 NOTE — ED ADULT NURSE NOTE - ASSOCIATED SYMPTOMS
pt. has pressure in lower abdomen that increases when she has to urinate. related to ectopic pregnancy.

## 2023-11-14 NOTE — BRIEF OPERATIVE NOTE - OPERATION/FINDINGS
Cook balloon inserted under US guidance, intrauterine balloon filled to 15cc, vaginal balloon filled to 10cc. Post-cook insertion TVUS performed; direct compression of cervical ectopic pregnancy visualized.

## 2023-11-14 NOTE — ED ADULT NURSE REASSESSMENT NOTE - NS ED NURSE REASSESS COMMENT FT1
Assumed care of pt from Kae HARTMANN at 1930. Pt is resting comfortably in stretcher. NAD. Pt is A&Ox4. Respirations are even and unlabored. Color is appropriate for race. Pt awaiting transport to OR. Report given to Jackie HARTMANN in same day surgery. Pt updated on plan of care. Pt sister at bedside.

## 2023-11-14 NOTE — H&P ADULT - ATTENDING COMMENTS
cervical ectopic noted today in office. sent in for MTX and for cervical balloon placement and inpatient management.   risks of bleeding were discussed.   she accepts blood products  Nita Kerr cervical ectopic noted today in office. sent in for MTX and for cervical balloon placement and inpatient management.   MTX given in ED. taken to OR for pelvic EUA, TVUS and placement of cook catheter under sono guidance for compression and control of bleeding.   risks of bleeding were discussed, including possible need for urgent hysterectomy  she accepts blood products  Nita Kerr

## 2023-11-14 NOTE — ED ADULT TRIAGE NOTE - CHIEF COMPLAINT QUOTE
ectopic pregnancy confirmed by us outpatient, sent to ed for eval mild discomfort. vaginal spotting. pt seen and told to come in by MD Willoughby for surgery

## 2023-11-14 NOTE — H&P ADULT - ASSESSMENT
A/P:   33 yo  at 5w1d by LMP sent to [facility] ED by OBGYN provider due to findings of cervical ectopic pregnancy on TVUS.   Patient informed of the risks and benefits of Methotrexate therapy, see chart for consents.         #Cervical Ectopic Pregnancy   -Gestational sac with yolk sac located in cervix, no fetal pole   -Plan for MTX   -CBC, CMP, T&S, serum Hcg  -To OR for EUA, placement of cervical balloon     Dispo: OR   Discussed with Dr. Yusuf and Dr. Lugo A/P:   33 yo  at 5w1d by LMP sent to [facility] ED by OBGYN provider due to findings of cervical ectopic pregnancy on TVUS. Patient is otherwise clinically and hemodynamically stable.   Patient informed of the risks and benefits of Methotrexate therapy, see chart for consents.         #Cervical Ectopic Pregnancy   -Gestational sac with yolk sac located in cervix, no fetal pole   -Plan for MTX   -CBC, CMP, T&S, serum Hcg  -To OR for EUA, placement of cervical balloon     Dispo: OR   Discussed with Dr. Yusuf and Dr. Lugo A/P:   33 yo  at 5w1d by LMP sent to [facility] ED by OBGYN provider due to findings of cervical ectopic pregnancy on TVUS. Patient is otherwise clinically and hemodynamically stable.   Patient informed of the risks and benefits of Methotrexate therapy, see chart for consents.         #Cervical Ectopic Pregnancy   -Gestational sac with yolk sac located in cervix, no fetal pole   -Plan for MTX pending labs  -CBC, CMP, T&S, serum Hcg  -To OR for EUA, placement of cervical balloon     Dispo: OR   Discussed with Dr. Yusuf and Dr. Lugo

## 2023-11-14 NOTE — ED PROVIDER NOTE - PHYSICAL EXAMINATION
General:     NAD  Head:     NC/AT, EOMI, oral mucosa moist  Neck:     trachea midline  Lungs:     CTA b/l, no w/r/r  CVS:     S1S2, RRR, no m/g/r  Abd:     +BS, s/nd, no organomegaly. ttp @ suprapubic, no rebound or guarding

## 2023-11-14 NOTE — ED ADULT NURSE NOTE - NS ED NOTE ABUSE RESPONSE YN
Infusion Nursing Note:  Winter Loya presents today for Zometa.    Patient seen by provider today: Yes: Dr. Tipton   present during visit today: Not Applicable.    Intravenous Access:  Peripheral IV placed in lab.    Treatment Conditions:  Lab Results   Component Value Date    HGB 14.1 06/10/2020     Lab Results   Component Value Date    WBC 3.9 06/10/2020      Lab Results   Component Value Date    ANEU 2.5 06/10/2020     Lab Results   Component Value Date     06/10/2020      Lab Results   Component Value Date     06/10/2020                   Lab Results   Component Value Date    POTASSIUM 3.5 06/10/2020             Lab Results   Component Value Date    CR 0.62 06/10/2020                   Lab Results   Component Value Date    ASHLEY 8.9 06/10/2020                Lab Results   Component Value Date    BILITOTAL 1.6 06/10/2020           Lab Results   Component Value Date    ALBUMIN 4.0 06/10/2020                    Lab Results   Component Value Date    ALT 47 06/10/2020           Lab Results   Component Value Date    AST 18 06/10/2020       Results reviewed, labs MET treatment parameters, ok to proceed with treatment.      Post Infusion Assessment:  Patient tolerated infusion without incident.  Blood return noted pre and post infusion.  Site patent and intact, free from redness, edema or discomfort.  No evidence of extravasations.  Access discontinued per protocol.       Discharge Plan:   Patient declined prescription refills.  Discharge instructions reviewed with: Patient.  Patient and/or family verbalized understanding of discharge instructions and all questions answered.  AVS to patient via [employer].  Next appointment needs to be scheduled.  Patient knows to call if she does not hear about Zometa in 3 months.   Patient discharged in stable condition accompanied by: self.  Departure Mode: Ambulatory.    Sarika Baig RN                        Yes

## 2023-11-14 NOTE — H&P ADULT - NSHPPHYSICALEXAM_GEN_ALL_CORE
Gen: NAD, well-appearing, AAOx3   Pulm: breathing comfortably on RA   Abd: soft, nontender, nondistended   Ext: non-tender, non-edematous   Pelvic: deferred at this time     Vital Signs Last 24 Hrs  T(C): 36.8 (14 Nov 2023 16:28), Max: 36.8 (14 Nov 2023 16:28)  T(F): 98.3 (14 Nov 2023 16:28), Max: 98.3 (14 Nov 2023 16:28)  HR: 99 (14 Nov 2023 16:28) (99 - 99)  BP: 137/93 (14 Nov 2023 16:28) (137/93 - 137/93)

## 2023-11-15 ENCOUNTER — TRANSCRIPTION ENCOUNTER (OUTPATIENT)
Age: 33
End: 2023-11-15

## 2023-11-15 LAB
CULTURE RESULTS: SIGNIFICANT CHANGE UP
CULTURE RESULTS: SIGNIFICANT CHANGE UP
HCG SERPL-ACNC: 2732 MIU/ML — HIGH
HCG SERPL-ACNC: 2732 MIU/ML — HIGH
SPECIMEN SOURCE: SIGNIFICANT CHANGE UP
SPECIMEN SOURCE: SIGNIFICANT CHANGE UP
T4 FREE SERPL-MCNC: 1.2 NG/DL — SIGNIFICANT CHANGE UP (ref 0.9–1.8)
T4 FREE SERPL-MCNC: 1.2 NG/DL — SIGNIFICANT CHANGE UP (ref 0.9–1.8)

## 2023-11-15 PROCEDURE — 99231 SBSQ HOSP IP/OBS SF/LOW 25: CPT | Mod: GC

## 2023-11-15 RX ORDER — LANOLIN ALCOHOL/MO/W.PET/CERES
3 CREAM (GRAM) TOPICAL AT BEDTIME
Refills: 0 | Status: DISCONTINUED | OUTPATIENT
Start: 2023-11-15 | End: 2023-11-17

## 2023-11-15 RX ORDER — LISDEXAMFETAMINE DIMESYLATE 70 MG/1
50 CAPSULE ORAL
Refills: 0 | Status: DISCONTINUED | OUTPATIENT
Start: 2023-11-15 | End: 2023-11-17

## 2023-11-15 RX ORDER — ACETAMINOPHEN 500 MG
975 TABLET ORAL EVERY 6 HOURS
Refills: 0 | Status: DISCONTINUED | OUTPATIENT
Start: 2023-11-15 | End: 2023-11-17

## 2023-11-15 RX ORDER — IBUPROFEN 200 MG
600 TABLET ORAL EVERY 6 HOURS
Refills: 0 | Status: DISCONTINUED | OUTPATIENT
Start: 2023-11-15 | End: 2023-11-17

## 2023-11-15 RX ADMIN — Medication 600 MILLIGRAM(S): at 15:49

## 2023-11-15 RX ADMIN — Medication 975 MILLIGRAM(S): at 01:52

## 2023-11-15 RX ADMIN — Medication 975 MILLIGRAM(S): at 18:28

## 2023-11-15 RX ADMIN — Medication 975 MILLIGRAM(S): at 05:03

## 2023-11-15 RX ADMIN — SIMETHICONE 80 MILLIGRAM(S): 80 TABLET, CHEWABLE ORAL at 05:04

## 2023-11-15 RX ADMIN — Medication 975 MILLIGRAM(S): at 18:58

## 2023-11-15 RX ADMIN — SIMETHICONE 80 MILLIGRAM(S): 80 TABLET, CHEWABLE ORAL at 22:43

## 2023-11-15 RX ADMIN — Medication 600 MILLIGRAM(S): at 22:11

## 2023-11-15 RX ADMIN — Medication 600 MILLIGRAM(S): at 21:41

## 2023-11-15 RX ADMIN — Medication 975 MILLIGRAM(S): at 12:00

## 2023-11-15 RX ADMIN — Medication 975 MILLIGRAM(S): at 11:05

## 2023-11-15 RX ADMIN — Medication 975 MILLIGRAM(S): at 02:52

## 2023-11-15 RX ADMIN — Medication 3 MILLIGRAM(S): at 21:42

## 2023-11-15 RX ADMIN — SIMETHICONE 80 MILLIGRAM(S): 80 TABLET, CHEWABLE ORAL at 15:48

## 2023-11-15 RX ADMIN — Medication 15 MILLIGRAM(S): at 13:15

## 2023-11-15 RX ADMIN — Medication 15 MILLIGRAM(S): at 05:33

## 2023-11-15 RX ADMIN — Medication 15 MILLIGRAM(S): at 13:45

## 2023-11-15 RX ADMIN — Medication 600 MILLIGRAM(S): at 16:19

## 2023-11-15 RX ADMIN — Medication 975 MILLIGRAM(S): at 06:03

## 2023-11-15 RX ADMIN — Medication 15 MILLIGRAM(S): at 05:03

## 2023-11-15 NOTE — DISCHARGE NOTE NURSING/CASE MANAGEMENT/SOCIAL WORK - NSDCPEFALRISK_GEN_ALL_CORE
For information on Fall & Injury Prevention, visit: [email or web address] OR  [email or web address] OR  https://www.cdc.gov/steadi/patient.html

## 2023-11-15 NOTE — DISCHARGE NOTE NURSING/CASE MANAGEMENT/SOCIAL WORK - PATIENT PORTAL LINK FT
You can access the FollowMyHealth Patient Portal offered by [facility] by registering at the following website: [email or web address]. By joining [facility]’s FollowMyHealth portal, you will also be able to view your health information using other applications (apps) compatible with our system.

## 2023-11-15 NOTE — PATIENT PROFILE ADULT - FALL HARM RISK - HARM RISK INTERVENTIONS

## 2023-11-15 NOTE — PROGRESS NOTE ADULT - ATTENDING COMMENTS
Patient seen and examined, agree with above. Patient's pain well controlled, bleeding minimal, vitals normal. Hcg with significant drop this AM, reassuring for cervical balloon and MTX being effective. Will continue to monitor inpatient due to bleeding risk, plan to remove balloon on 11/17. Discussed plan with patient who expressed understanding.    Bogdan Wilson MD

## 2023-11-15 NOTE — ED ADULT NURSE NOTE - NS ED NURSE RECORD ANOTHER VITAL SIGN
"[facility] Yes

## 2023-11-15 NOTE — PROGRESS NOTE ADULT - SUBJECTIVE AND OBJECTIVE BOX
PATRICIA PETTY is a 31yo now POD1 s/p EUA, cervical balloon placement    S:    Patient seen and examined at bedside.   Pain is controlled with current treatment regimen.   Tolerating regular diet, had some nausea after procedure, feeling better this morning.  Ambulating without difficulty.   Voiding spontaneously.   Patient has Cook Balloon in place.   Patient has no other complaints at this time.    O:   T(C): 37.1 (11-15-23 @ 04:55), Max: 37.1 (11-15-23 @ 04:55)  HR: 85 (11-15-23 @ 04:55) (66 - 99)  BP: 113/73 (11-15-23 @ 04:55) (113/73 - 146/78)  RR: 18 (11-15-23 @ 04:55) (12 - 22)  SpO2: 97% (11-15-23 @ 04:55) (96% - 100%)    Gen: NAD, AOx3  CV: RRR  Pulm: CTAB  Abdomen: Soft, nondistended, nontender, + BS   Cook Balloon in place  Extremities: No calf or popliteal tenderness, non-edematous    Labs:                         13.4   9.45  )-----------( 359      ( 14 Nov 2023 18:20 )             38.3     11-14    138  |  102  |  8.7  ----------------------------<  83  3.7   |  23.0  |  0.57    Ca    9.3      14 Nov 2023 18:20    TPro  7.0  /  Alb  4.4  /  TBili  0.3<L>  /  DBili  x   /  AST  14  /  ALT  12  /  AlkPhos  51  11-14    LIVER FUNCTIONS - ( 14 Nov 2023 18:20 )  Alb: 4.4 g/dL / Pro: 7.0 g/dL / ALK PHOS: 51 U/L / ALT: 12 U/L / AST: 14 U/L / GGT: x           PT/INR - ( 14 Nov 2023 18:20 )   PT: 13.1 sec;   INR: 1.19 ratio    PTT - ( 14 Nov 2023 18:20 )  PTT:33.3 sec  Urinalysis Basic - ( 14 Nov 2023 18:20 )  Color: x / Appearance: x / SG: x / pH: x  Gluc: 83 mg/dL / Ketone: x  / Bili: x / Urobili: x   Blood: x / Protein: x / Nitrite: x   Leuk Esterase: x / RBC: x / WBC x   Sq Epi: x / Non Sq Epi: x / Bacteria: x    11-14-23 @ 07:01  -  11-15-23 @ 06:25  --------------------------------------------------------  IN: 0 mL / OUT: 20 mL / NET: -20 mL

## 2023-11-15 NOTE — PROGRESS NOTE ADULT - ASSESSMENT
A/P:   33yo now POD1 s/p EUA, cervical balloon placement (15cc/10cc) for cervical ectopic pregnancy  Patient is also s/p MTX on 11/14  f/u serial Beta Hcg to assess response to cervical ectopic mgmt    Gen: No other complaints  Neuro: Pain well controlled on current regimen  CV: VSS. No acute interventions at this time.  Pulm: Breathing comfortably on room air  GI: Bowel sounds/function normal, no nausea  : Beebe removed, voiding spontaneously  Heme: 13.4> AM labs pending  DVT ppx: Ambulation encouraged,   Dispo: Continued monitoring with serial Beta Hcg A/P:   33yo now POD1 s/p EUA, cervical balloon placement (15cc/10cc) for cervical ectopic pregnancy  Patient is also s/p MTX on   f/u serial Beta Hcg to assess response to cervical ectopic mgmt  Hc () > 2732 (11/15) >     Gen: No other complaints  Neuro: Pain well controlled on current regimen  CV: VSS. No acute interventions at this time.  Pulm: Breathing comfortably on room air  GI: Bowel sounds/function normal, no nausea  : Beebe removed, voiding spontaneously  Heme: 13.4> AM labs pending  DVT ppx: Ambulation encouraged,   Dispo: Continued monitoring with serial Beta Hcg A/P:   33yo now POD1 s/p EUA, cervical balloon placement (15cc/10cc) for cervical ectopic pregnancy  Patient is also s/p MTX on   f/u serial Beta Hcg to assess response to cervical ectopic mgmt  Hc () > 2732 (11/15) >     Gen: No other complaints  Neuro: Pain well controlled on current regimen  CV: VSS. No acute interventions at this time.  Pulm: Breathing comfortably on room air  GI: Bowel sounds/function normal, no nausea  : Beebe removed, voiding spontaneously  DVT ppx: Ambulation encouraged,   Dispo: Continued monitoring with serial Beta Hcg

## 2023-11-16 LAB
ALBUMIN SERPL ELPH-MCNC: 3.9 G/DL — SIGNIFICANT CHANGE UP (ref 3.3–5.2)
ALBUMIN SERPL ELPH-MCNC: 3.9 G/DL — SIGNIFICANT CHANGE UP (ref 3.3–5.2)
ALP SERPL-CCNC: 42 U/L — SIGNIFICANT CHANGE UP (ref 40–120)
ALP SERPL-CCNC: 42 U/L — SIGNIFICANT CHANGE UP (ref 40–120)
ALT FLD-CCNC: 9 U/L — SIGNIFICANT CHANGE UP
ALT FLD-CCNC: 9 U/L — SIGNIFICANT CHANGE UP
ANION GAP SERPL CALC-SCNC: 14 MMOL/L — SIGNIFICANT CHANGE UP (ref 5–17)
ANION GAP SERPL CALC-SCNC: 14 MMOL/L — SIGNIFICANT CHANGE UP (ref 5–17)
AST SERPL-CCNC: 12 U/L — SIGNIFICANT CHANGE UP
AST SERPL-CCNC: 12 U/L — SIGNIFICANT CHANGE UP
BILIRUB SERPL-MCNC: 0.5 MG/DL — SIGNIFICANT CHANGE UP (ref 0.4–2)
BILIRUB SERPL-MCNC: 0.5 MG/DL — SIGNIFICANT CHANGE UP (ref 0.4–2)
BUN SERPL-MCNC: 8.7 MG/DL — SIGNIFICANT CHANGE UP (ref 8–20)
BUN SERPL-MCNC: 8.7 MG/DL — SIGNIFICANT CHANGE UP (ref 8–20)
CALCIUM SERPL-MCNC: 8.5 MG/DL — SIGNIFICANT CHANGE UP (ref 8.4–10.5)
CALCIUM SERPL-MCNC: 8.5 MG/DL — SIGNIFICANT CHANGE UP (ref 8.4–10.5)
CHLORIDE SERPL-SCNC: 104 MMOL/L — SIGNIFICANT CHANGE UP (ref 96–108)
CHLORIDE SERPL-SCNC: 104 MMOL/L — SIGNIFICANT CHANGE UP (ref 96–108)
CO2 SERPL-SCNC: 21 MMOL/L — LOW (ref 22–29)
CO2 SERPL-SCNC: 21 MMOL/L — LOW (ref 22–29)
CREAT SERPL-MCNC: 0.47 MG/DL — LOW (ref 0.5–1.3)
CREAT SERPL-MCNC: 0.47 MG/DL — LOW (ref 0.5–1.3)
EGFR: 131 ML/MIN/1.73M2 — SIGNIFICANT CHANGE UP
EGFR: 131 ML/MIN/1.73M2 — SIGNIFICANT CHANGE UP
GLUCOSE SERPL-MCNC: 102 MG/DL — HIGH (ref 70–99)
GLUCOSE SERPL-MCNC: 102 MG/DL — HIGH (ref 70–99)
HCG SERPL-ACNC: 858.9 MIU/ML — HIGH
HCG SERPL-ACNC: 858.9 MIU/ML — HIGH
HCT VFR BLD CALC: 35.1 % — SIGNIFICANT CHANGE UP (ref 34.5–45)
HCT VFR BLD CALC: 35.1 % — SIGNIFICANT CHANGE UP (ref 34.5–45)
HGB BLD-MCNC: 12.2 G/DL — SIGNIFICANT CHANGE UP (ref 11.5–15.5)
HGB BLD-MCNC: 12.2 G/DL — SIGNIFICANT CHANGE UP (ref 11.5–15.5)
MCHC RBC-ENTMCNC: 31.6 PG — SIGNIFICANT CHANGE UP (ref 27–34)
MCHC RBC-ENTMCNC: 31.6 PG — SIGNIFICANT CHANGE UP (ref 27–34)
MCHC RBC-ENTMCNC: 34.8 GM/DL — SIGNIFICANT CHANGE UP (ref 32–36)
MCHC RBC-ENTMCNC: 34.8 GM/DL — SIGNIFICANT CHANGE UP (ref 32–36)
MCV RBC AUTO: 90.9 FL — SIGNIFICANT CHANGE UP (ref 80–100)
MCV RBC AUTO: 90.9 FL — SIGNIFICANT CHANGE UP (ref 80–100)
PLATELET # BLD AUTO: 300 K/UL — SIGNIFICANT CHANGE UP (ref 150–400)
PLATELET # BLD AUTO: 300 K/UL — SIGNIFICANT CHANGE UP (ref 150–400)
POTASSIUM SERPL-MCNC: 3.6 MMOL/L — SIGNIFICANT CHANGE UP (ref 3.5–5.3)
POTASSIUM SERPL-MCNC: 3.6 MMOL/L — SIGNIFICANT CHANGE UP (ref 3.5–5.3)
POTASSIUM SERPL-SCNC: 3.6 MMOL/L — SIGNIFICANT CHANGE UP (ref 3.5–5.3)
POTASSIUM SERPL-SCNC: 3.6 MMOL/L — SIGNIFICANT CHANGE UP (ref 3.5–5.3)
PROT SERPL-MCNC: 6.1 G/DL — LOW (ref 6.6–8.7)
PROT SERPL-MCNC: 6.1 G/DL — LOW (ref 6.6–8.7)
RBC # BLD: 3.86 M/UL — SIGNIFICANT CHANGE UP (ref 3.8–5.2)
RBC # BLD: 3.86 M/UL — SIGNIFICANT CHANGE UP (ref 3.8–5.2)
RBC # FLD: 12.2 % — SIGNIFICANT CHANGE UP (ref 10.3–14.5)
RBC # FLD: 12.2 % — SIGNIFICANT CHANGE UP (ref 10.3–14.5)
SODIUM SERPL-SCNC: 138 MMOL/L — SIGNIFICANT CHANGE UP (ref 135–145)
SODIUM SERPL-SCNC: 138 MMOL/L — SIGNIFICANT CHANGE UP (ref 135–145)
WBC # BLD: 10.5 K/UL — SIGNIFICANT CHANGE UP (ref 3.8–10.5)
WBC # BLD: 10.5 K/UL — SIGNIFICANT CHANGE UP (ref 3.8–10.5)
WBC # FLD AUTO: 10.5 K/UL — SIGNIFICANT CHANGE UP (ref 3.8–10.5)
WBC # FLD AUTO: 10.5 K/UL — SIGNIFICANT CHANGE UP (ref 3.8–10.5)

## 2023-11-16 RX ORDER — KETOROLAC TROMETHAMINE 30 MG/ML
15 SYRINGE (ML) INJECTION ONCE
Refills: 0 | Status: DISCONTINUED | OUTPATIENT
Start: 2023-11-16 | End: 2023-11-16

## 2023-11-16 RX ADMIN — SIMETHICONE 80 MILLIGRAM(S): 80 TABLET, CHEWABLE ORAL at 19:53

## 2023-11-16 RX ADMIN — Medication 3 MILLIGRAM(S): at 23:06

## 2023-11-16 RX ADMIN — Medication 975 MILLIGRAM(S): at 19:53

## 2023-11-16 RX ADMIN — Medication 15 MILLIGRAM(S): at 23:35

## 2023-11-16 RX ADMIN — Medication 600 MILLIGRAM(S): at 21:10

## 2023-11-16 RX ADMIN — Medication 600 MILLIGRAM(S): at 13:45

## 2023-11-16 RX ADMIN — Medication 15 MILLIGRAM(S): at 23:05

## 2023-11-16 RX ADMIN — Medication 600 MILLIGRAM(S): at 19:53

## 2023-11-16 RX ADMIN — Medication 975 MILLIGRAM(S): at 08:11

## 2023-11-16 RX ADMIN — Medication 975 MILLIGRAM(S): at 21:10

## 2023-11-16 RX ADMIN — Medication 975 MILLIGRAM(S): at 09:00

## 2023-11-16 RX ADMIN — Medication 600 MILLIGRAM(S): at 14:08

## 2023-11-16 RX ADMIN — SIMETHICONE 80 MILLIGRAM(S): 80 TABLET, CHEWABLE ORAL at 08:12

## 2023-11-16 NOTE — PROGRESS NOTE ADULT - SUBJECTIVE AND OBJECTIVE BOX
PATRICIA PETTY is a 31yo now POD2 s/p EUA, cervical balloon placement (15cc/10cc) for cervical ectopic pregnancy    S:    Patient seen and examined at bedside.   Pain is controlled with current treatment regimen.   Tolerating regular diet, denies N/V.   Ambulating without difficulty.   Voiding spontaneously, reports flatus, no BM at this time.  Patient has no other complaints at this time.    O:   T(C): 36.8 (11-16-23 @ 04:38), Max: 37.1 (11-15-23 @ 16:25)  HR: 82 (11-16-23 @ 04:38) (82 - 93)  BP: 101/62 (11-16-23 @ 04:38) (101/62 - 135/89)  RR: 18 (11-16-23 @ 04:38) (18 - 19)  SpO2: 98% (11-16-23 @ 04:38) (97% - 98%)    Gen: NAD, AOx3  CV: RRR  Pulm: CTAB  Abdomen: Soft, nondistended, appropriately tender, + BS   Incision: Clean, dry and intact  Extremities: No calf or popliteal tenderness, non-edematous    Labs:                         13.4   9.45  )-----------( 359      ( 14 Nov 2023 18:20 )             38.3     11-14    138  |  102  |  8.7  ----------------------------<  83  3.7   |  23.0  |  0.57    Ca    9.3      14 Nov 2023 18:20    TPro  7.0  /  Alb  4.4  /  TBili  0.3<L>  /  DBili  x   /  AST  14  /  ALT  12  /  AlkPhos  51  11-14    LIVER FUNCTIONS - ( 14 Nov 2023 18:20 )  Alb: 4.4 g/dL / Pro: 7.0 g/dL / ALK PHOS: 51 U/L / ALT: 12 U/L / AST: 14 U/L / GGT: x           PT/INR - ( 14 Nov 2023 18:20 )   PT: 13.1 sec;   INR: 1.19 ratio         PTT - ( 14 Nov 2023 18:20 )  PTT:33.3 sec  Urinalysis Basic - ( 14 Nov 2023 18:20 )    Color: x / Appearance: x / SG: x / pH: x  Gluc: 83 mg/dL / Ketone: x  / Bili: x / Urobili: x   Blood: x / Protein: x / Nitrite: x   Leuk Esterase: x / RBC: x / WBC x   Sq Epi: x / Non Sq Epi: x / Bacteria: x        11-14-23 @ 07:01  -  11-15-23 @ 07:00  --------------------------------------------------------  IN: 0 mL / OUT: 20 mL / NET: -20 mL                 PATRICIA PETTY is a 33yo now POD2 s/p EUA, cervical balloon placement (15cc/10cc) for cervical ectopic pregnancy    S:    Patient seen and examined at bedside.   Pain is controlled with current treatment regimen.   Tolerating regular diet, reports more gas than usual and some pain associated with it after lunch yesterday  Improvement in symptoms of increased gas and pain today  Had a bowel movement yesterday  Ambulating without difficulty  Voiding spontaneously  Patient has Cook Balloon in place.   Patient has no other complaints at this time.    O:   T(C): 36.8 (11-16-23 @ 04:38), Max: 37.1 (11-15-23 @ 16:25)  HR: 82 (11-16-23 @ 04:38) (82 - 93)  BP: 101/62 (11-16-23 @ 04:38) (101/62 - 135/89)  RR: 18 (11-16-23 @ 04:38) (18 - 19)  SpO2: 98% (11-16-23 @ 04:38) (97% - 98%)    Gen: NAD, AOx3  CV: RRR  Pulm: CTAB  Abdomen: Soft, nondistended, appropriately tender  Cook balloon in place  Extremities: No calf or popliteal tenderness, non-edematous    Labs:                         13.4   9.45  )-----------( 359      ( 14 Nov 2023 18:20 )             38.3     11-14    138  |  102  |  8.7  ----------------------------<  83  3.7   |  23.0  |  0.57    Ca    9.3      14 Nov 2023 18:20    TPro  7.0  /  Alb  4.4  /  TBili  0.3<L>  /  DBili  x   /  AST  14  /  ALT  12  /  AlkPhos  51  11-14    LIVER FUNCTIONS - ( 14 Nov 2023 18:20 )  Alb: 4.4 g/dL / Pro: 7.0 g/dL / ALK PHOS: 51 U/L / ALT: 12 U/L / AST: 14 U/L / GGT: x           PT/INR - ( 14 Nov 2023 18:20 )   PT: 13.1 sec;   INR: 1.19 ratio         PTT - ( 14 Nov 2023 18:20 )  PTT:33.3 sec  Urinalysis Basic - ( 14 Nov 2023 18:20 )    Color: x / Appearance: x / SG: x / pH: x  Gluc: 83 mg/dL / Ketone: x  / Bili: x / Urobili: x   Blood: x / Protein: x / Nitrite: x   Leuk Esterase: x / RBC: x / WBC x   Sq Epi: x / Non Sq Epi: x / Bacteria: x        11-14-23 @ 07:01  -  11-15-23 @ 07:00  --------------------------------------------------------  IN: 0 mL / OUT: 20 mL / NET: -20 mL                 ,DirectAddress_Unknown
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
## 2023-11-16 NOTE — PROGRESS NOTE ADULT - ASSESSMENT
A/P:     31yo now POD2 s/p EUA, cervical balloon placement (15cc/10cc) for cervical ectopic pregnancy  Patient is also s/p MTX on   f/u serial Beta Hcg to assess response to cervical ectopic mgmt  Hc () > 2732 (11/15) >Awaited ()    Gen: No other complaints  Neuro: Pain well controlled on current regimen  CV: VSS. No acute interventions at this time.  Pulm: Incentive spirometer use encouraged  GI: Bowel sounds/function normal, tolerating PO diet  : Beebe removed, voiding spontaneously  Heme: AM labs pending  DVT ppx: Ambulation encouraged, SCDs when in bed, lovenox  Dispo: Will discuss with attending A/P:     31yo now POD2 s/p EUA, cervical balloon placement (15cc/10cc) for cervical ectopic pregnancy  Patient is also s/p MTX on   F/u serial Beta Hcg to assess response to cervical ectopic mgmt  Hc () > 2732 (11/15) >AM labs Awaited ()    Gen: No other complaints  Neuro: Pain well controlled on current regimen  CV: VSS. No acute interventions at this time.  Pulm: Normal respiratory effort, no cough  GI: Bowel sounds/function normal, tolerating PO diet  : Voiding spontaneously  Heme: AM labs pending  DVT ppx: Ambulation encouraged  Dispo:  Continued monitoring with serial Beta Hcg   A/P:     31yo now POD2 s/p EUA, cervical balloon placement (15cc/10cc) for cervical ectopic pregnancy  Patient is also s/p MTX on   F/u serial Beta Hcg to assess response to cervical ectopic mgmt  Hc () > 2732 (11/15) >858 ()    Gen: No other complaints  Neuro: Pain well controlled on current regimen  CV: VSS. No acute interventions at this time.  Pulm: Normal respiratory effort, no cough  GI: Bowel sounds/function normal, tolerating PO diet  : Voiding spontaneously  Heme: AM labs pending  DVT ppx: Ambulation encouraged  Dispo:  Continued monitoring with serial Beta Hcg

## 2023-11-17 ENCOUNTER — TRANSCRIPTION ENCOUNTER (OUTPATIENT)
Age: 33
End: 2023-11-17

## 2023-11-17 VITALS
DIASTOLIC BLOOD PRESSURE: 66 MMHG | HEART RATE: 82 BPM | OXYGEN SATURATION: 93 % | TEMPERATURE: 98 F | SYSTOLIC BLOOD PRESSURE: 106 MMHG | RESPIRATION RATE: 18 BRPM

## 2023-11-17 LAB
ALBUMIN SERPL ELPH-MCNC: 3.5 G/DL — SIGNIFICANT CHANGE UP (ref 3.3–5.2)
ALBUMIN SERPL ELPH-MCNC: 3.5 G/DL — SIGNIFICANT CHANGE UP (ref 3.3–5.2)
ALP SERPL-CCNC: 33 U/L — LOW (ref 40–120)
ALP SERPL-CCNC: 33 U/L — LOW (ref 40–120)
ALT FLD-CCNC: 10 U/L — SIGNIFICANT CHANGE UP
ALT FLD-CCNC: 10 U/L — SIGNIFICANT CHANGE UP
ANION GAP SERPL CALC-SCNC: 7 MMOL/L — SIGNIFICANT CHANGE UP (ref 5–17)
ANION GAP SERPL CALC-SCNC: 7 MMOL/L — SIGNIFICANT CHANGE UP (ref 5–17)
AST SERPL-CCNC: 10 U/L — SIGNIFICANT CHANGE UP
AST SERPL-CCNC: 10 U/L — SIGNIFICANT CHANGE UP
BILIRUB SERPL-MCNC: 0.4 MG/DL — SIGNIFICANT CHANGE UP (ref 0.4–2)
BILIRUB SERPL-MCNC: 0.4 MG/DL — SIGNIFICANT CHANGE UP (ref 0.4–2)
BUN SERPL-MCNC: 8.1 MG/DL — SIGNIFICANT CHANGE UP (ref 8–20)
BUN SERPL-MCNC: 8.1 MG/DL — SIGNIFICANT CHANGE UP (ref 8–20)
CALCIUM SERPL-MCNC: 8.5 MG/DL — SIGNIFICANT CHANGE UP (ref 8.4–10.5)
CALCIUM SERPL-MCNC: 8.5 MG/DL — SIGNIFICANT CHANGE UP (ref 8.4–10.5)
CHLORIDE SERPL-SCNC: 103 MMOL/L — SIGNIFICANT CHANGE UP (ref 96–108)
CHLORIDE SERPL-SCNC: 103 MMOL/L — SIGNIFICANT CHANGE UP (ref 96–108)
CO2 SERPL-SCNC: 25 MMOL/L — SIGNIFICANT CHANGE UP (ref 22–29)
CO2 SERPL-SCNC: 25 MMOL/L — SIGNIFICANT CHANGE UP (ref 22–29)
CREAT SERPL-MCNC: 0.52 MG/DL — SIGNIFICANT CHANGE UP (ref 0.5–1.3)
CREAT SERPL-MCNC: 0.52 MG/DL — SIGNIFICANT CHANGE UP (ref 0.5–1.3)
EGFR: 128 ML/MIN/1.73M2 — SIGNIFICANT CHANGE UP
EGFR: 128 ML/MIN/1.73M2 — SIGNIFICANT CHANGE UP
GLUCOSE SERPL-MCNC: 97 MG/DL — SIGNIFICANT CHANGE UP (ref 70–99)
GLUCOSE SERPL-MCNC: 97 MG/DL — SIGNIFICANT CHANGE UP (ref 70–99)
HCG SERPL-ACNC: 425.6 MIU/ML — HIGH
HCG SERPL-ACNC: 425.6 MIU/ML — HIGH
HCT VFR BLD CALC: 33.1 % — LOW (ref 34.5–45)
HCT VFR BLD CALC: 33.1 % — LOW (ref 34.5–45)
HGB BLD-MCNC: 11.2 G/DL — LOW (ref 11.5–15.5)
HGB BLD-MCNC: 11.2 G/DL — LOW (ref 11.5–15.5)
MCHC RBC-ENTMCNC: 31.2 PG — SIGNIFICANT CHANGE UP (ref 27–34)
MCHC RBC-ENTMCNC: 31.2 PG — SIGNIFICANT CHANGE UP (ref 27–34)
MCHC RBC-ENTMCNC: 33.8 GM/DL — SIGNIFICANT CHANGE UP (ref 32–36)
MCHC RBC-ENTMCNC: 33.8 GM/DL — SIGNIFICANT CHANGE UP (ref 32–36)
MCV RBC AUTO: 92.2 FL — SIGNIFICANT CHANGE UP (ref 80–100)
MCV RBC AUTO: 92.2 FL — SIGNIFICANT CHANGE UP (ref 80–100)
PLATELET # BLD AUTO: 288 K/UL — SIGNIFICANT CHANGE UP (ref 150–400)
PLATELET # BLD AUTO: 288 K/UL — SIGNIFICANT CHANGE UP (ref 150–400)
POTASSIUM SERPL-MCNC: 3.5 MMOL/L — SIGNIFICANT CHANGE UP (ref 3.5–5.3)
POTASSIUM SERPL-MCNC: 3.5 MMOL/L — SIGNIFICANT CHANGE UP (ref 3.5–5.3)
POTASSIUM SERPL-SCNC: 3.5 MMOL/L — SIGNIFICANT CHANGE UP (ref 3.5–5.3)
POTASSIUM SERPL-SCNC: 3.5 MMOL/L — SIGNIFICANT CHANGE UP (ref 3.5–5.3)
PROT SERPL-MCNC: 5.8 G/DL — LOW (ref 6.6–8.7)
PROT SERPL-MCNC: 5.8 G/DL — LOW (ref 6.6–8.7)
RBC # BLD: 3.59 M/UL — LOW (ref 3.8–5.2)
RBC # BLD: 3.59 M/UL — LOW (ref 3.8–5.2)
RBC # FLD: 12.4 % — SIGNIFICANT CHANGE UP (ref 10.3–14.5)
RBC # FLD: 12.4 % — SIGNIFICANT CHANGE UP (ref 10.3–14.5)
SODIUM SERPL-SCNC: 135 MMOL/L — SIGNIFICANT CHANGE UP (ref 135–145)
SODIUM SERPL-SCNC: 135 MMOL/L — SIGNIFICANT CHANGE UP (ref 135–145)
WBC # BLD: 10.75 K/UL — HIGH (ref 3.8–10.5)
WBC # BLD: 10.75 K/UL — HIGH (ref 3.8–10.5)
WBC # FLD AUTO: 10.75 K/UL — HIGH (ref 3.8–10.5)
WBC # FLD AUTO: 10.75 K/UL — HIGH (ref 3.8–10.5)

## 2023-11-17 PROCEDURE — 84480 ASSAY TRIIODOTHYRONINE (T3): CPT

## 2023-11-17 PROCEDURE — 86901 BLOOD TYPING SEROLOGIC RH(D): CPT

## 2023-11-17 PROCEDURE — 84436 ASSAY OF TOTAL THYROXINE: CPT

## 2023-11-17 PROCEDURE — 84702 CHORIONIC GONADOTROPIN TEST: CPT

## 2023-11-17 PROCEDURE — 84443 ASSAY THYROID STIM HORMONE: CPT

## 2023-11-17 PROCEDURE — 86900 BLOOD TYPING SEROLOGIC ABO: CPT

## 2023-11-17 PROCEDURE — 86703 HIV-1/HIV-2 1 RESULT ANTBDY: CPT

## 2023-11-17 PROCEDURE — 85610 PROTHROMBIN TIME: CPT

## 2023-11-17 PROCEDURE — 80053 COMPREHEN METABOLIC PANEL: CPT

## 2023-11-17 PROCEDURE — 84439 ASSAY OF FREE THYROXINE: CPT

## 2023-11-17 PROCEDURE — 99285 EMERGENCY DEPT VISIT HI MDM: CPT

## 2023-11-17 PROCEDURE — C1726: CPT

## 2023-11-17 PROCEDURE — 36415 COLL VENOUS BLD VENIPUNCTURE: CPT

## 2023-11-17 PROCEDURE — 81003 URINALYSIS AUTO W/O SCOPE: CPT

## 2023-11-17 PROCEDURE — 85027 COMPLETE CBC AUTOMATED: CPT

## 2023-11-17 PROCEDURE — 85730 THROMBOPLASTIN TIME PARTIAL: CPT

## 2023-11-17 PROCEDURE — 85025 COMPLETE CBC W/AUTO DIFF WBC: CPT

## 2023-11-17 PROCEDURE — 86850 RBC ANTIBODY SCREEN: CPT

## 2023-11-17 PROCEDURE — 87086 URINE CULTURE/COLONY COUNT: CPT

## 2023-11-17 PROCEDURE — 99238 HOSP IP/OBS DSCHRG MGMT 30/<: CPT | Mod: GC

## 2023-11-17 RX ADMIN — Medication 600 MILLIGRAM(S): at 06:46

## 2023-11-17 RX ADMIN — SIMETHICONE 80 MILLIGRAM(S): 80 TABLET, CHEWABLE ORAL at 06:45

## 2023-11-17 RX ADMIN — Medication 975 MILLIGRAM(S): at 06:46

## 2023-11-17 NOTE — PROGRESS NOTE ADULT - SUBJECTIVE AND OBJECTIVE BOX
PATRICIA PETTY is a 31yo now POD3 s/p EUA, cervical balloon placement (15cc/10cc) for cervical ectopic pregnancy    S:    Patient seen and examined at bedside.   Pain is controlled with current treatment regimen.   Tolerating regular diet, reports gas pain 5/10, goes to 3/10 after tylenol/motrin.  +bm, +flatus  Ambulating without difficulty  Voiding spontaneously  Patient has Cook Balloon in place.   Patient has no other complaints at this time.    O:   Vital Signs Last 24 Hrs  T(C): 36.7 (17 Nov 2023 04:27), Max: 37.3 (16 Nov 2023 08:02)  T(F): 98 (17 Nov 2023 04:27), Max: 99.1 (16 Nov 2023 08:02)  HR: 84 (17 Nov 2023 04:27) (84 - 104)  BP: 100/65 (17 Nov 2023 04:27) (100/65 - 116/73)      Gen: NAD, AOx3  CV: RRR  Pulm: CTAB  Abdomen: Soft, nondistended, appropriately tender  Cook balloon in place  Extremities: No calf or popliteal tenderness, non-edematous         LABS:                       11.2   10.75 )-----------( 288      ( 17 Nov 2023 04:57 )             33.1     11-16    138  |  104  |  8.7  ----------------------------<  102<H>  3.6   |  21.0<L>  |  0.47<L>    Ca    8.5      16 Nov 2023 07:19    TPro  6.1<L>  /  Alb  3.9  /  TBili  0.5  /  DBili  x   /  AST  12  /  ALT  9   /  AlkPhos  42  11-16    LIVER FUNCTIONS - ( 16 Nov 2023 07:19 )  Alb: 3.9 g/dL / Pro: 6.1 g/dL / ALK PHOS: 42 U/L / ALT: 9 U/L / AST: 12 U/L / GGT: x             Urinalysis Basic - ( 16 Nov 2023 07:19 )    Color: x / Appearance: x / SG: x / pH: x  Gluc: 102 mg/dL / Ketone: x  / Bili: x / Urobili: x   Blood: x / Protein: x / Nitrite: x   Leuk Esterase: x / RBC: x / WBC x   Sq Epi: x / Non Sq Epi: x / Bacteria: x            11-14-23 @ 07:01  -  11-15-23 @ 07:00  --------------------------------------------------------  IN: 0 mL / OUT: 20 mL / NET: -20 mL

## 2023-11-17 NOTE — DISCHARGE NOTE PROVIDER - HOSPITAL COURSE
Flory was admitted on 11/14 for cervical ectopic found in the office. On that day, HCG was 5100 and she received 1 dose of Im methotrexate. She was taken to the OR for EUA and a cook catheter was placed in the uterus and cervix for compression of the ectopic. On POD1-3 serial HCG showed decrease to 2700->898->400 this morning. The cook catheter was removed and she had no bleeding. She was discharged in stable condition with close interval follow up

## 2023-11-17 NOTE — DISCHARGE NOTE PROVIDER - NSDCFUSCHEDAPPT_GEN_ALL_CORE_FT
Bakari Green Physician [facility]  NEUROLOGY [address] [facility]  Scheduled Appointment: 11/30/2023

## 2023-11-17 NOTE — PROGRESS NOTE ADULT - ASSESSMENT
A/P:   33yo now POD3 s/p EUA, cervical balloon placement (15cc/10cc) for cervical ectopic pregnancy  Patient is also s/p MTX on   F/u serial Beta Hcg to assess response to cervical ectopic mgmt  Hc () > 2732 (11/15) >858 ()    Gen: Pt has gas pain, responds to tylenol/ibuprofen  Neuro: Pain well controlled on current regimen  CV: VSS. No acute interventions at this time.  Pulm: Normal respiratory effort, no cough  GI: Bowel sounds/function normal, tolerating PO diet  : Voiding spontaneously  Heme: 13.4> 12.2>11.2 downtrending, but pt asymptomatic  DVT ppx: Ambulation encouraged  Dispo:  Continued monitoring with serial Beta Hcg   A/P:   31yo now POD3 s/p EUA, cervical balloon placement (15cc/10cc) for cervical ectopic pregnancy  Patient is also s/p MTX on   F/u serial Beta Hcg to assess response to cervical ectopic mgmt  Hc () > 2732 (11/15) >858 ()>  AM pending    Gen: Pt has gas pain, responds to tylenol/ibuprofen  Neuro: Pain well controlled on current regimen  CV: VSS. No acute interventions at this time.  Pulm: Normal respiratory effort, no cough  GI: Bowel sounds/function normal, tolerating PO diet  : Voiding spontaneously  Heme: 13.4> 12.2>11.2 downtrending, but pt asymptomatic  DVT ppx: Ambulation encouraged  Dispo: likely home today, pending attending approval

## 2023-11-17 NOTE — DISCHARGE NOTE PROVIDER - CARE PROVIDER_API CALL
Nita Kerr  Obstetrics and Gynecology  [address] [address]  Phone: [phone]  Fax: [phone]  Follow Up Time:

## 2023-11-17 NOTE — PROGRESS NOTE ADULT - ATTENDING COMMENTS
HCG now 400. CB removed without issue at the bedside.   Discharge home with follow up next week in the office  Precautions reviewed  Nita Kerr

## 2023-11-21 ENCOUNTER — APPOINTMENT (OUTPATIENT)
Dept: OBGYN | Facility: CLINIC | Age: 33
End: 2023-11-21
Payer: MEDICAID

## 2023-11-21 VITALS
HEIGHT: 62 IN | BODY MASS INDEX: 20.98 KG/M2 | DIASTOLIC BLOOD PRESSURE: 60 MMHG | SYSTOLIC BLOOD PRESSURE: 100 MMHG | WEIGHT: 114 LBS

## 2023-11-21 DIAGNOSIS — Z30.09 ENCOUNTER FOR OTHER GENERAL COUNSELING AND ADVICE ON CONTRACEPTION: ICD-10-CM

## 2023-11-21 PROCEDURE — 99214 OFFICE O/P EST MOD 30 MIN: CPT | Mod: TH,25

## 2023-11-22 ENCOUNTER — NON-APPOINTMENT (OUTPATIENT)
Age: 33
End: 2023-11-22

## 2023-11-22 PROBLEM — Z30.09 BIRTH CONTROL COUNSELING: Status: ACTIVE | Noted: 2023-11-22

## 2023-11-25 LAB
HCG SERPL-MCNC: 89 MIU/ML
HCT VFR BLD CALC: 38.1 %
HGB BLD-MCNC: 12.3 G/DL
MCHC RBC-ENTMCNC: 32.1 PG
MCHC RBC-ENTMCNC: 32.3 GM/DL
MCV RBC AUTO: 99.5 FL
PLATELET # BLD AUTO: 391 K/UL
RBC # BLD: 3.83 M/UL
RBC # FLD: 12.7 %
WBC # FLD AUTO: 6.14 K/UL

## 2023-11-28 ENCOUNTER — ASOB RESULT (OUTPATIENT)
Age: 33
End: 2023-11-28

## 2023-11-28 ENCOUNTER — APPOINTMENT (OUTPATIENT)
Dept: ANTEPARTUM | Facility: CLINIC | Age: 33
End: 2023-11-28
Payer: MEDICAID

## 2023-11-28 ENCOUNTER — APPOINTMENT (OUTPATIENT)
Dept: OBGYN | Facility: CLINIC | Age: 33
End: 2023-11-28
Payer: MEDICAID

## 2023-11-28 VITALS
BODY MASS INDEX: 21.71 KG/M2 | WEIGHT: 118 LBS | HEIGHT: 62 IN | SYSTOLIC BLOOD PRESSURE: 122 MMHG | DIASTOLIC BLOOD PRESSURE: 84 MMHG

## 2023-11-28 DIAGNOSIS — O00.80 OTHER. ECTOPIC PREGNANCY WITHOUT INTRAUTERINE PREGNANCY: ICD-10-CM

## 2023-11-28 DIAGNOSIS — Z30.430 ENCOUNTER FOR INSERTION OF INTRAUTERINE CONTRACEPTIVE DEVICE: ICD-10-CM

## 2023-11-28 DIAGNOSIS — B96.89 ACUTE VAGINITIS: ICD-10-CM

## 2023-11-28 DIAGNOSIS — Z11.3 ENCOUNTER FOR SCREENING FOR INFECTIONS WITH A PREDOMINANTLY SEXUAL MODE OF TRANSMISSION: ICD-10-CM

## 2023-11-28 DIAGNOSIS — N76.0 ACUTE VAGINITIS: ICD-10-CM

## 2023-11-28 PROCEDURE — 58300 INSERT INTRAUTERINE DEVICE: CPT

## 2023-11-28 PROCEDURE — 76830 TRANSVAGINAL US NON-OB: CPT

## 2023-11-28 PROCEDURE — 76857 US EXAM PELVIC LIMITED: CPT | Mod: 59

## 2023-11-30 ENCOUNTER — APPOINTMENT (OUTPATIENT)
Dept: NEUROLOGY | Facility: CLINIC | Age: 33
End: 2023-11-30
Payer: MEDICAID

## 2023-11-30 VITALS
BODY MASS INDEX: 20.55 KG/M2 | WEIGHT: 116 LBS | SYSTOLIC BLOOD PRESSURE: 110 MMHG | DIASTOLIC BLOOD PRESSURE: 76 MMHG | HEIGHT: 63 IN | HEART RATE: 100 BPM

## 2023-11-30 DIAGNOSIS — F32.A DEPRESSION, UNSPECIFIED: ICD-10-CM

## 2023-11-30 PROBLEM — Z30.430 ENCOUNTER FOR INSERTION OF PROGESTIN-RELEASING INTRAUTERINE CONTRACEPTIVE DEVICE (IUD): Status: ACTIVE | Noted: 2023-11-30

## 2023-11-30 PROCEDURE — 99215 OFFICE O/P EST HI 40 MIN: CPT

## 2023-11-30 RX ORDER — METRONIDAZOLE 7.5 MG/G
0.75 GEL VAGINAL
Qty: 1 | Refills: 1 | Status: ACTIVE | COMMUNITY
Start: 2023-11-30 | End: 1900-01-01

## 2023-12-01 LAB
C TRACH RRNA SPEC QL NAA+PROBE: NOT DETECTED
CANDIDA VAG CYTO: NOT DETECTED
G VAGINALIS+PREV SP MTYP VAG QL MICRO: DETECTED
HCG SERPL-MCNC: 9 MIU/ML
N GONORRHOEA RRNA SPEC QL NAA+PROBE: NOT DETECTED
SOURCE AMPLIFICATION: NORMAL
T VAGINALIS VAG QL WET PREP: NOT DETECTED

## 2023-12-01 RX ORDER — METRONIDAZOLE 7.5 MG/G
0.75 GEL VAGINAL
Qty: 1 | Refills: 0 | Status: ACTIVE | COMMUNITY
Start: 2023-11-30

## 2023-12-11 ENCOUNTER — TRANSCRIPTION ENCOUNTER (OUTPATIENT)
Age: 33
End: 2023-12-11

## 2023-12-11 ENCOUNTER — NON-APPOINTMENT (OUTPATIENT)
Age: 33
End: 2023-12-11

## 2024-01-03 RX ORDER — LISDEXAMFETAMINE DIMESYLATE 50 MG/1
50 CAPSULE ORAL
Qty: 30 | Refills: 0 | Status: DISCONTINUED | COMMUNITY
Start: 2023-09-22 | End: 2024-01-03

## 2024-01-03 RX ORDER — LISDEXAMFETAMINE 50 MG/1
50 CAPSULE ORAL
Qty: 30 | Refills: 0 | Status: DISCONTINUED | COMMUNITY
Start: 2020-12-29 | End: 2024-01-03

## 2024-02-27 ENCOUNTER — APPOINTMENT (OUTPATIENT)
Dept: OBGYN | Facility: CLINIC | Age: 34
End: 2024-02-27

## 2024-03-07 ENCOUNTER — APPOINTMENT (OUTPATIENT)
Dept: NEUROLOGY | Facility: CLINIC | Age: 34
End: 2024-03-07
Payer: MEDICAID

## 2024-03-07 VITALS
BODY MASS INDEX: 20.55 KG/M2 | TEMPERATURE: 97.6 F | HEIGHT: 63 IN | DIASTOLIC BLOOD PRESSURE: 92 MMHG | WEIGHT: 116 LBS | HEART RATE: 116 BPM | SYSTOLIC BLOOD PRESSURE: 132 MMHG

## 2024-03-07 PROCEDURE — 99214 OFFICE O/P EST MOD 30 MIN: CPT

## 2024-03-07 PROCEDURE — G2211 COMPLEX E/M VISIT ADD ON: CPT | Mod: NC,1L

## 2024-03-07 NOTE — DISCUSSION/SUMMARY
[FreeTextEntry1] : 33-year-old woman with a history of attention deficit hyperactivity disorder, presently on Adderall extended release 30 mg in the morning, immediate release 10 mg, usually she takes to 2 tablets for total of 20 mg in the early afternoon.  Overall the effects are acceptable, finds the effects of usually weighing before the end of her shift, find herself as the day goes on a little bit more disjointed, unfocused. Vyvanse 50 mg worked the best.  But still having difficulty obtaining it. Reviewed and discussed options as far as treatment. Plan: Will continue Adderall extended release 30 mg in the early morning, and will change the Adderall immediate release to the 30 mg tablet. Discussed possible side effect, could contact me if any changes necessary. Return to the office, 3 to 4 months.

## 2024-03-07 NOTE — HISTORY OF PRESENT ILLNESS
[FreeTextEntry1] : 33-year-old woman history of attention deficit hyperactivity disorder here for follow-up visit.  Previously on Vyvanse 50 mg in the morning, and Adderall immediate release 10 mg in the morning afternoon.  Which worked well. More recently because of a persistent shortage of Vyvanse, we have been using the Adderall extended release 30 in the morning, and immediate release 10 mg, 1 or 2 in the mid afternoon.  Does not work as well as of Vyvanse but she says she is tolerated well and eventually does what he supposed to do.  Helps her focus, be more productive. We did try bupropion for anxiety, had difficulty tolerating made her more anxious more irritable.  So she discontinued it. She feels her mood is stable, anxiety is under control.
complains of pain/discomfort

## 2024-03-07 NOTE — REVIEW OF SYSTEMS
[As Noted in HPI] : as noted in HPI [Decr. Concentrating Ability] : decreased concentrating ability [Negative] : Endocrine

## 2024-03-07 NOTE — PHYSICAL EXAM
[General Appearance - In No Acute Distress] : in no acute distress [Oriented To Time, Place, And Person] : oriented to person, place, and time [General Appearance - Alert] : alert [Affect] : the affect was normal [Impaired Insight] : insight and judgment were intact

## 2024-04-08 RX ORDER — LISDEXAMFETAMINE 50 MG/1
50 CAPSULE ORAL
Qty: 30 | Refills: 0 | Status: ACTIVE | COMMUNITY
Start: 2023-01-24 | End: 1900-01-01

## 2024-05-16 RX ORDER — DEXTROAMPHETAMINE SULFATE, DEXTROAMPHETAMINE SACCHARATE, AMPHETAMINE SULFATE AND AMPHETAMINE ASPARTATE 7.5; 7.5; 7.5; 7.5 MG/1; MG/1; MG/1; MG/1
30 CAPSULE, EXTENDED RELEASE ORAL
Qty: 30 | Refills: 0 | Status: DISCONTINUED | COMMUNITY
Start: 2020-11-30 | End: 2024-05-16

## 2024-06-20 RX ORDER — DEXTROAMPHETAMINE SACCHARATE, AMPHETAMINE ASPARTATE, DEXTROAMPHETAMINE SULFATE AND AMPHETAMINE SULFATE 7.5; 7.5; 7.5; 7.5 MG/1; MG/1; MG/1; MG/1
30 TABLET ORAL
Qty: 30 | Refills: 0 | Status: ACTIVE | COMMUNITY
Start: 2022-06-10 | End: 1900-01-01

## 2024-10-02 NOTE — HISTORY OF PRESENT ILLNESS
Detail Level: Detailed
[FreeTextEntry1] : 30-year-old woman, right-handed, history of attention deficit disorder, for followup visit. Last visit prescribed Vyvanse 30 mg daily. Reportedly no significant side effects, no mood changes, no headaches, no chest or palpitation, no trouble sleeping.She finds the medication did gentler, onset as well as wearing off. The dose can be a little higher, but otherwise it helps her keep herself focused, avoid reductions inattention.\par Denies any new medical problems, no recent hospitalizations or illnesses.

## 2024-10-22 ENCOUNTER — APPOINTMENT (OUTPATIENT)
Dept: NEUROLOGY | Facility: CLINIC | Age: 34
End: 2024-10-22
Payer: MEDICAID

## 2024-10-22 VITALS
HEIGHT: 63 IN | TEMPERATURE: 98.2 F | WEIGHT: 118 LBS | SYSTOLIC BLOOD PRESSURE: 134 MMHG | DIASTOLIC BLOOD PRESSURE: 88 MMHG | HEART RATE: 102 BPM | BODY MASS INDEX: 20.91 KG/M2

## 2024-10-22 PROCEDURE — 99213 OFFICE O/P EST LOW 20 MIN: CPT

## 2025-03-28 NOTE — DISCHARGE NOTE NURSING/CASE MANAGEMENT/SOCIAL WORK - CASE MANAGER'S NAME
[facility] or [facility]. JOCELYN Olson       Malina THOMPSON RN CCC Olanzapine Pregnancy And Lactation Text: This medication is pregnancy category C.   There are no adequate and well controlled trials with olanzapine in pregnant females.  Olanzapine should be used during pregnancy only if the potential benefit justifies the potential risk to the fetus.   In a study in lactating healthy women, olanzapine was excreted in breast milk.  It is recommended that women taking olanzapine should not breast feed.

## 2025-06-17 NOTE — ED ADULT NURSE NOTE - NSFALLUNIVINTERV_ED_ALL_ED
Bed/Stretcher in lowest position, wheels locked, appropriate side rails in place/Call bell, personal items and telephone in reach/Instruct patient to call for assistance before getting out of bed/chair/stretcher/Non-slip footwear applied when patient is off stretcher/[place] to call system/Physically safe environment - no spills, clutter or unnecessary equipment/Purposeful proactive rounding/Room/bathroom lighting operational, light cord in reach
dietitian/nutrition services

## 2025-07-14 ENCOUNTER — APPOINTMENT (OUTPATIENT)
Dept: NEUROLOGY | Facility: CLINIC | Age: 35
End: 2025-07-14
Payer: MEDICAID

## 2025-07-14 VITALS
DIASTOLIC BLOOD PRESSURE: 80 MMHG | HEART RATE: 87 BPM | SYSTOLIC BLOOD PRESSURE: 128 MMHG | HEIGHT: 63 IN | WEIGHT: 118 LBS | BODY MASS INDEX: 20.91 KG/M2 | RESPIRATION RATE: 15 BRPM

## 2025-07-14 PROCEDURE — G2211 COMPLEX E/M VISIT ADD ON: CPT | Mod: NC

## 2025-07-14 PROCEDURE — 99213 OFFICE O/P EST LOW 20 MIN: CPT

## (undated) DEVICE — SOL BAG NS 0.9% 1000ML

## (undated) DEVICE — WARMING BLANKET UPPER ADULT

## (undated) DEVICE — GLV 7.5 PROTEXIS (WHITE)

## (undated) DEVICE — ENDOCATCH II 15MM

## (undated) DEVICE — DRAPE LAVH 124" X 30" X125"

## (undated) DEVICE — UTERINE MANIPULATOR CONMED VCARE LG 37MM

## (undated) DEVICE — SYR LUER LOK 10CC

## (undated) DEVICE — SUT VICRYL 0 27" CT-2 UNDYED

## (undated) DEVICE — PREP CHLORAPREP HI-LITE ORANGE 26ML

## (undated) DEVICE — PREP TRAY DRY SKIN PREP SCRUB

## (undated) DEVICE — PREP DYNA-HEX CHG 4% 4OZ BOTTLE (BACTOSHIELD)

## (undated) DEVICE — D HELP - CLEARVIEW CLEARIFY SYSTEM

## (undated) DEVICE — ENDOCATCH 10MM SPECIMEN POUCH

## (undated) DEVICE — SOL IRR POUR H2O 1000ML

## (undated) DEVICE — LIGASURE BLUNT TIP 37CM

## (undated) DEVICE — TROCAR COVIDIEN VERSAPORT BLADELESS OPTICAL 5MM STANDARD

## (undated) DEVICE — TROCAR COVIDIEN VERSAPORT BLADELESS OPTICAL 11MM STANDARD

## (undated) DEVICE — BLADE SURGICAL #15 CARBON

## (undated) DEVICE — SOL IRR POUR NS 0.9% 1000ML

## (undated) DEVICE — ELCTR CORD FOOTSWITCH 1PLR LAPSCP 10FT

## (undated) DEVICE — SYR CATH TIP 2 OZ

## (undated) DEVICE — POSITIONER PINK PAD PIGAZZI SYSTEM

## (undated) DEVICE — ELCTR GROUNDING PAD ADULT COVIDIEN

## (undated) DEVICE — SYR CONTROL LUER LOK 10CC

## (undated) DEVICE — SUT VICRYL PLUS 4-0 18" PS-2 UNDYED

## (undated) DEVICE — PACK GYN LAPAROSCOPY

## (undated) DEVICE — TUBING INSUFFLATION LAP FILTER 10FT

## (undated) DEVICE — UTERINE MANIPULATOR CONMED VCARE MED 34MM

## (undated) DEVICE — UTERINE MANIPULATOR CONMED VCARE SM 32MM

## (undated) DEVICE — GOWN XL W TOWEL

## (undated) DEVICE — SUT VLOC 180 0 9" GS-21 GREEN

## (undated) DEVICE — TUBING IRR SET FOR CYSTOSCOPY 77"

## (undated) DEVICE — LIGASURE MARYLAND 37CM

## (undated) DEVICE — TUBING STRYKEFLOW II SUCTION / IRRIGATOR

## (undated) DEVICE — FOLEY TRAY 16FR 5CC LF UMETER CLOSED

## (undated) DEVICE — SUT VLOC 180 0 6" GS-21 GREEN

## (undated) DEVICE — INSUFFLATION NDL COVIDIEN STEP 14G FOR STEP/VERSASTEP

## (undated) DEVICE — DRAPE TOWEL BLUE 17" X 24"

## (undated) DEVICE — VENODYNE/SCD SLEEVE CALF MEDIUM